# Patient Record
Sex: FEMALE | Race: WHITE | NOT HISPANIC OR LATINO | URBAN - METROPOLITAN AREA
[De-identification: names, ages, dates, MRNs, and addresses within clinical notes are randomized per-mention and may not be internally consistent; named-entity substitution may affect disease eponyms.]

---

## 2017-03-21 ENCOUNTER — OUTPATIENT (OUTPATIENT)
Dept: OUTPATIENT SERVICES | Facility: HOSPITAL | Age: 47
LOS: 1 days | Discharge: HOME | End: 2017-03-21

## 2017-06-27 DIAGNOSIS — Z12.31 ENCOUNTER FOR SCREENING MAMMOGRAM FOR MALIGNANT NEOPLASM OF BREAST: ICD-10-CM

## 2017-09-18 ENCOUNTER — OUTPATIENT (OUTPATIENT)
Dept: OUTPATIENT SERVICES | Facility: HOSPITAL | Age: 47
LOS: 1 days | Discharge: HOME | End: 2017-09-18

## 2017-09-18 DIAGNOSIS — E04.0 NONTOXIC DIFFUSE GOITER: ICD-10-CM

## 2018-05-22 PROBLEM — Z00.00 ENCOUNTER FOR PREVENTIVE HEALTH EXAMINATION: Status: ACTIVE | Noted: 2018-05-22

## 2018-05-30 ENCOUNTER — APPOINTMENT (OUTPATIENT)
Dept: OBGYN | Facility: CLINIC | Age: 48
End: 2018-05-30
Payer: COMMERCIAL

## 2018-05-30 PROCEDURE — 99396 PREV VISIT EST AGE 40-64: CPT

## 2018-06-05 ENCOUNTER — OUTPATIENT (OUTPATIENT)
Dept: OUTPATIENT SERVICES | Facility: HOSPITAL | Age: 48
LOS: 1 days | Discharge: HOME | End: 2018-06-05

## 2018-06-05 DIAGNOSIS — N64.4 MASTODYNIA: ICD-10-CM

## 2018-09-18 ENCOUNTER — EMERGENCY (EMERGENCY)
Facility: HOSPITAL | Age: 48
LOS: 0 days | Discharge: HOME | End: 2018-09-18
Attending: EMERGENCY MEDICINE | Admitting: EMERGENCY MEDICINE

## 2018-09-18 VITALS — TEMPERATURE: 96 F | SYSTOLIC BLOOD PRESSURE: 121 MMHG | HEART RATE: 69 BPM | DIASTOLIC BLOOD PRESSURE: 59 MMHG

## 2018-09-18 VITALS
OXYGEN SATURATION: 98 % | HEART RATE: 82 BPM | SYSTOLIC BLOOD PRESSURE: 140 MMHG | HEIGHT: 65 IN | WEIGHT: 225.09 LBS | RESPIRATION RATE: 20 BRPM | DIASTOLIC BLOOD PRESSURE: 79 MMHG | TEMPERATURE: 97 F

## 2018-09-18 DIAGNOSIS — R07.89 OTHER CHEST PAIN: ICD-10-CM

## 2018-09-18 DIAGNOSIS — R42 DIZZINESS AND GIDDINESS: ICD-10-CM

## 2018-09-18 DIAGNOSIS — R06.02 SHORTNESS OF BREATH: ICD-10-CM

## 2018-09-18 DIAGNOSIS — R07.9 CHEST PAIN, UNSPECIFIED: ICD-10-CM

## 2018-09-18 DIAGNOSIS — E03.9 HYPOTHYROIDISM, UNSPECIFIED: ICD-10-CM

## 2018-09-18 DIAGNOSIS — R10.13 EPIGASTRIC PAIN: ICD-10-CM

## 2018-09-18 DIAGNOSIS — R11.0 NAUSEA: ICD-10-CM

## 2018-09-18 DIAGNOSIS — Z82.49 FAMILY HISTORY OF ISCHEMIC HEART DISEASE AND OTHER DISEASES OF THE CIRCULATORY SYSTEM: ICD-10-CM

## 2018-09-18 LAB
ALBUMIN SERPL ELPH-MCNC: 4.2 G/DL — SIGNIFICANT CHANGE UP (ref 3.5–5.2)
ALP SERPL-CCNC: 69 U/L — SIGNIFICANT CHANGE UP (ref 30–115)
ALT FLD-CCNC: 18 U/L — SIGNIFICANT CHANGE UP (ref 0–41)
ANION GAP SERPL CALC-SCNC: 12 MMOL/L — SIGNIFICANT CHANGE UP (ref 7–14)
APTT BLD: 30.4 SEC — SIGNIFICANT CHANGE UP (ref 27–39.2)
AST SERPL-CCNC: 17 U/L — SIGNIFICANT CHANGE UP (ref 0–41)
BASOPHILS # BLD AUTO: 0.08 K/UL — SIGNIFICANT CHANGE UP (ref 0–0.2)
BASOPHILS NFR BLD AUTO: 1 % — SIGNIFICANT CHANGE UP (ref 0–1)
BILIRUB SERPL-MCNC: 0.3 MG/DL — SIGNIFICANT CHANGE UP (ref 0.2–1.2)
BUN SERPL-MCNC: 9 MG/DL — LOW (ref 10–20)
CALCIUM SERPL-MCNC: 9.2 MG/DL — SIGNIFICANT CHANGE UP (ref 8.5–10.1)
CHLORIDE SERPL-SCNC: 102 MMOL/L — SIGNIFICANT CHANGE UP (ref 98–110)
CHOLEST SERPL-MCNC: 185 MG/DL — SIGNIFICANT CHANGE UP (ref 100–200)
CO2 SERPL-SCNC: 26 MMOL/L — SIGNIFICANT CHANGE UP (ref 17–32)
CREAT SERPL-MCNC: 0.7 MG/DL — SIGNIFICANT CHANGE UP (ref 0.7–1.5)
D DIMER BLD IA.RAPID-MCNC: 140 NG/ML DDU — SIGNIFICANT CHANGE UP (ref 0–230)
EOSINOPHIL # BLD AUTO: 0.27 K/UL — SIGNIFICANT CHANGE UP (ref 0–0.7)
EOSINOPHIL NFR BLD AUTO: 3.3 % — SIGNIFICANT CHANGE UP (ref 0–8)
GLUCOSE SERPL-MCNC: 97 MG/DL — SIGNIFICANT CHANGE UP (ref 70–99)
HCG SERPL QL: NEGATIVE — SIGNIFICANT CHANGE UP
HCT VFR BLD CALC: 36.3 % — LOW (ref 37–47)
HDLC SERPL-MCNC: 57 MG/DL — SIGNIFICANT CHANGE UP
HGB BLD-MCNC: 11.9 G/DL — LOW (ref 12–16)
IMM GRANULOCYTES NFR BLD AUTO: 0.1 % — SIGNIFICANT CHANGE UP (ref 0.1–0.3)
INR BLD: 1.06 RATIO — SIGNIFICANT CHANGE UP (ref 0.65–1.3)
LIPID PNL WITH DIRECT LDL SERPL: 117 MG/DL — SIGNIFICANT CHANGE UP (ref 4–129)
LYMPHOCYTES # BLD AUTO: 2.39 K/UL — SIGNIFICANT CHANGE UP (ref 1.2–3.4)
LYMPHOCYTES # BLD AUTO: 29.3 % — SIGNIFICANT CHANGE UP (ref 20.5–51.1)
MAGNESIUM SERPL-MCNC: 2.1 MG/DL — SIGNIFICANT CHANGE UP (ref 1.8–2.4)
MCHC RBC-ENTMCNC: 28.8 PG — SIGNIFICANT CHANGE UP (ref 27–31)
MCHC RBC-ENTMCNC: 32.8 G/DL — SIGNIFICANT CHANGE UP (ref 32–37)
MCV RBC AUTO: 87.9 FL — SIGNIFICANT CHANGE UP (ref 81–99)
MONOCYTES # BLD AUTO: 0.76 K/UL — HIGH (ref 0.1–0.6)
MONOCYTES NFR BLD AUTO: 9.3 % — SIGNIFICANT CHANGE UP (ref 1.7–9.3)
NEUTROPHILS # BLD AUTO: 4.66 K/UL — SIGNIFICANT CHANGE UP (ref 1.4–6.5)
NEUTROPHILS NFR BLD AUTO: 57 % — SIGNIFICANT CHANGE UP (ref 42.2–75.2)
NRBC # BLD: 0 /100 WBCS — SIGNIFICANT CHANGE UP (ref 0–0)
PLATELET # BLD AUTO: 321 K/UL — SIGNIFICANT CHANGE UP (ref 130–400)
POTASSIUM SERPL-MCNC: 3.9 MMOL/L — SIGNIFICANT CHANGE UP (ref 3.5–5)
POTASSIUM SERPL-SCNC: 3.9 MMOL/L — SIGNIFICANT CHANGE UP (ref 3.5–5)
PROT SERPL-MCNC: 7.3 G/DL — SIGNIFICANT CHANGE UP (ref 6–8)
PROTHROM AB SERPL-ACNC: 11.4 SEC — SIGNIFICANT CHANGE UP (ref 9.95–12.87)
RBC # BLD: 4.13 M/UL — LOW (ref 4.2–5.4)
RBC # FLD: 14.7 % — HIGH (ref 11.5–14.5)
SODIUM SERPL-SCNC: 140 MMOL/L — SIGNIFICANT CHANGE UP (ref 135–146)
TOTAL CHOLESTEROL/HDL RATIO MEASUREMENT: 3.2 RATIO — LOW (ref 4–5.5)
TRIGL SERPL-MCNC: 102 MG/DL — SIGNIFICANT CHANGE UP (ref 10–149)
TROPONIN T SERPL-MCNC: <0.01 NG/ML — SIGNIFICANT CHANGE UP
TROPONIN T SERPL-MCNC: <0.01 NG/ML — SIGNIFICANT CHANGE UP
WBC # BLD: 8.17 K/UL — SIGNIFICANT CHANGE UP (ref 4.8–10.8)
WBC # FLD AUTO: 8.17 K/UL — SIGNIFICANT CHANGE UP (ref 4.8–10.8)

## 2018-09-18 RX ORDER — SODIUM CHLORIDE 9 MG/ML
1000 INJECTION INTRAMUSCULAR; INTRAVENOUS; SUBCUTANEOUS ONCE
Qty: 0 | Refills: 0 | Status: COMPLETED | OUTPATIENT
Start: 2018-09-18 | End: 2018-09-18

## 2018-09-18 RX ORDER — ASPIRIN/CALCIUM CARB/MAGNESIUM 324 MG
325 TABLET ORAL ONCE
Qty: 0 | Refills: 0 | Status: COMPLETED | OUTPATIENT
Start: 2018-09-18 | End: 2018-09-18

## 2018-09-18 RX ADMIN — SODIUM CHLORIDE 1000 MILLILITER(S): 9 INJECTION INTRAMUSCULAR; INTRAVENOUS; SUBCUTANEOUS at 07:51

## 2018-09-18 RX ADMIN — Medication 325 MILLIGRAM(S): at 07:51

## 2018-09-18 RX ADMIN — SODIUM CHLORIDE 1000 MILLILITER(S): 9 INJECTION INTRAMUSCULAR; INTRAVENOUS; SUBCUTANEOUS at 08:51

## 2018-09-18 NOTE — ED ADULT TRIAGE NOTE - CHIEF COMPLAINT QUOTE
" I have pain in my left chest and goes in my arm started @ 5 am."." " I have pain in my left chest and goes in my arm started @ 5 am. and my stomach feels bloated"."

## 2018-09-18 NOTE — ED CDU PROVIDER INITIAL DAY NOTE - ATTENDING CONTRIBUTION TO CARE
Seen with PA agreee with above, lungs- clear, abdomen- soft no tenderness to any region, neuro- non focal, s1s2 no gallops or murmurs, full workup in progress will continue to re-evaluate

## 2018-09-18 NOTE — ED PROVIDER NOTE - ATTENDING CONTRIBUTION TO CARE
This is a 48yoF hypothyroidism remote former smoker who presents for an episode of chest pain associated w/ SOB, nausea, abd bloating and L arm tingling.  Episode started ~5am while sitting at work.  Denies prior chest pain or recent cardiac workup.  FHx extensive CAD (father with 4vCABG x 3?) but no early heart disease.  No sick contacts.  Pain much improved now, though still some residual discomfort compared to baseline.    VSS  CONSTITUTIONAL: well developed; well nourished; well appearing in no acute distress  HEAD: normocephalic; atraumatic  EYES: no conjunctival injection, no scleral icterus  ENT: no nasal discharge; airway clear.  NECK: supple; non tender. + full passive ROM in all directions  CARD: S1, S2 normal; no murmurs, gallops, or rubs. Regular rate and rhythm  RESP: no wheezes, rales or rhonchi. Good air movement bilaterally without significant accessory muscle use  ABD: soft; non-distended; non-tender. No rebound, no guarding, no pulsatile abdominal mass  EXT: moving all extremities spontaneously, normal ROM. Mild peripheral edema b/l  SKIN: warm and dry, no lesions noted  NEURO: alert, oriented, CN II-XII grossly intact, motor and sensory grossly intact, speech nonslurred, no focal deficits. GCS 15  PSYCH: calm, cooperative, appropriate, good eye contact, logical thought process, no apparent danger to self or others    EKG NSR normal axis normal intervals similar compared to prior  labs  cardiac monitoring    Did have long car ride (>10hr) ~2weeks ago with transient foot swelling.  No extremity sx now, but that is concerning for PE, so will obtain d-dimer. If pos, will proceed to PE. If neg, pt to be admitted (or obs) for chest pain workup, including serial troponins, cardiac monitoring and some form of risk stratification, likely stress test.  Pt currently very comfortable appearing, asking for a blanket, speaking in full sentences w/o respiratory or other distress.

## 2018-09-18 NOTE — ED PROVIDER NOTE - PHYSICAL EXAMINATION
VITAL SIGNS: I have reviewed nursing notes and confirm.  CONSTITUTIONAL: Well-developed; well-nourished; in no acute distress.  SKIN: Skin exam is warm and dry, no acute rash.  HEAD: Normocephalic; atraumatic.  EYES: PERRL, EOM intact; conjunctiva and sclera clear.  ENT: No nasal discharge; airway clear.   NECK: Supple; non tender.  CARD: S1, S2 normal; no murmurs, gallops, or rubs. Regular rate and rhythm.  RESP: Clear to auscultation bilaterally. No wheezes, rales or rhonchi.  ABD: Normal bowel sounds; soft; non-distended; non-tender.   EXT: Normal ROM. No edema. No calf tenderness.   LYMPH: No acute cervical adenopathy.  NEURO: Alert, oriented. Grossly unremarkable. No focal deficits.  PSYCH: Cooperative, appropriate.

## 2018-09-18 NOTE — ED ADULT NURSE NOTE - OBJECTIVE STATEMENT
Pt c/o midsternal chest pain radiating to left arm, abd bloating, SOB, and nausea since 5am this morning. Pt is  in hospital and was at rest when pain began. Denies h/a, numbness, tingling, vomiting. AAO x 4. Hx of hypothyroidism

## 2018-09-18 NOTE — ED CDU PROVIDER DISPOSITION NOTE - CLINICAL COURSE
Patient was sent to [facility] for further evaluation of one episode of chest pains and epigastric pains, Has remained in no distress and with stable vitals, ekgs times two no evidence of ischemic chnages, enzymes times two normal, Patient has not had any further chest pains. Does not want to undergo stress testing and wants to fallow up with cardiology Rickie Wilde this week

## 2018-09-18 NOTE — ED PROVIDER NOTE - MEDICAL DECISION MAKING DETAILS
48yoF hypothyroid remote former smoker presents for substernal chest pain w/ abd and L arm sx, now improved.  EKG, trop x 1 neg.  Pt given aspirin and placed in observation for further risk stratification and ongoing monitoring.

## 2018-09-18 NOTE — ED CDU PROVIDER INITIAL DAY NOTE - OBJECTIVE STATEMENT
49 y/o female with PMHx of Hypothyroidism, Former Smoker, Positive Cardiac Family History presenting for chest pain. As per pt, while at work this morning around 5am began to have chest pain. Pt states pain started mid-sternal and radiated downward into the abdomen, then pain radiated to the left side of her chest. Pt states she was also experiencing left arm heaviness/tingling. Pt states pain lasted for approx an hour. States pain had progressively worsened. Also admits to nausea and diaphoresis. Denies any previous history of chest pain. Cardiologist - Dr Wilde

## 2018-09-18 NOTE — ED PROVIDER NOTE - NS ED ROS FT
Review of Systems:  	•	CONSTITUTIONAL - no fever, no diaphoresis, no chills  	•	SKIN - no rash  	•	HEMATOLOGIC - no bleeding, no bruising  	•	EYES - no eye pain, no blurry vision  	•	ENT - no congestion  	•	RESPIRATORY - + shortness of breath, no cough  	•	CARDIAC - + chest pain, no palpitations  	•	GI - + abd pain, + nausea, no vomiting, no diarrhea, no constipation  	•	GENITO-URINARY - no dysuria; no hematuria, no increased urinary frequency  	•	MUSCULOSKELETAL - +left arm heaviness, no swelling, no redness  	•	NEUROLOGIC - +lightheadedness, no weakness, no headache, no paresthesias, no LOC  	All other ROS are negative except as documented in HPI.

## 2018-09-18 NOTE — ED ADULT NURSE NOTE - NSIMPLEMENTINTERV_GEN_ALL_ED
Implemented All Universal Safety Interventions:  [place] to call system. Call bell, personal items and telephone within reach. Instruct patient to call for assistance. Room bathroom lighting operational. Non-slip footwear when patient is off stretcher. Physically safe environment: no spills, clutter or unnecessary equipment. Stretcher in lowest position, wheels locked, appropriate side rails in place.

## 2018-09-18 NOTE — ED PROVIDER NOTE - OBJECTIVE STATEMENT
49 yo F with pmhx of hypothyroidism presenting with midsternal chest pain radiating to left arm associated with shortness of breath, nausea, abdominal bloating, and lightheadedness that started around 5am. Patient was sitting down at work when chest pain began. No pleuritic chest pain. Patient is a former smoker. No prior cardiac workup. +Family history of CAD - father at age 59. No fever, chills, abdominal pain, vomiting, diarrhea, back pain, urinary symptoms, headache, paresthesias, or weakness. +Patient with recent long car ride to [facility] 2 weeks ago.

## 2019-01-13 ENCOUNTER — EMERGENCY (EMERGENCY)
Facility: HOSPITAL | Age: 49
LOS: 0 days | Discharge: HOME | End: 2019-01-13
Attending: EMERGENCY MEDICINE | Admitting: EMERGENCY MEDICINE

## 2019-01-13 VITALS
SYSTOLIC BLOOD PRESSURE: 115 MMHG | OXYGEN SATURATION: 96 % | RESPIRATION RATE: 18 BRPM | TEMPERATURE: 97 F | DIASTOLIC BLOOD PRESSURE: 60 MMHG | HEART RATE: 79 BPM

## 2019-01-13 DIAGNOSIS — Z79.899 OTHER LONG TERM (CURRENT) DRUG THERAPY: ICD-10-CM

## 2019-01-13 DIAGNOSIS — E03.9 HYPOTHYROIDISM, UNSPECIFIED: ICD-10-CM

## 2019-01-13 DIAGNOSIS — K80.70 CALCULUS OF GALLBLADDER AND BILE DUCT WITHOUT CHOLECYSTITIS WITHOUT OBSTRUCTION: ICD-10-CM

## 2019-01-13 DIAGNOSIS — R11.2 NAUSEA WITH VOMITING, UNSPECIFIED: ICD-10-CM

## 2019-01-13 DIAGNOSIS — R10.9 UNSPECIFIED ABDOMINAL PAIN: ICD-10-CM

## 2019-01-13 LAB
ALBUMIN SERPL ELPH-MCNC: 4.1 G/DL — SIGNIFICANT CHANGE UP (ref 3.5–5.2)
ALP SERPL-CCNC: 77 U/L — SIGNIFICANT CHANGE UP (ref 30–115)
ALT FLD-CCNC: 23 U/L — SIGNIFICANT CHANGE UP (ref 0–41)
ANION GAP SERPL CALC-SCNC: 11 MMOL/L — SIGNIFICANT CHANGE UP (ref 7–14)
AST SERPL-CCNC: 16 U/L — SIGNIFICANT CHANGE UP (ref 0–41)
BASE EXCESS BLDV CALC-SCNC: 3.4 MMOL/L — HIGH (ref -2–2)
BASOPHILS # BLD AUTO: 0.08 K/UL — SIGNIFICANT CHANGE UP (ref 0–0.2)
BASOPHILS NFR BLD AUTO: 0.9 % — SIGNIFICANT CHANGE UP (ref 0–1)
BILIRUB DIRECT SERPL-MCNC: <0.2 MG/DL — SIGNIFICANT CHANGE UP (ref 0–0.2)
BILIRUB INDIRECT FLD-MCNC: >0 MG/DL — LOW (ref 0.2–1.2)
BILIRUB SERPL-MCNC: 0.2 MG/DL — SIGNIFICANT CHANGE UP (ref 0.2–1.2)
BUN SERPL-MCNC: 9 MG/DL — LOW (ref 10–20)
CA-I SERPL-SCNC: 1.36 MMOL/L — HIGH (ref 1.12–1.3)
CALCIUM SERPL-MCNC: 9.1 MG/DL — SIGNIFICANT CHANGE UP (ref 8.5–10.1)
CHLORIDE SERPL-SCNC: 101 MMOL/L — SIGNIFICANT CHANGE UP (ref 98–110)
CO2 SERPL-SCNC: 27 MMOL/L — SIGNIFICANT CHANGE UP (ref 17–32)
CREAT SERPL-MCNC: 0.6 MG/DL — LOW (ref 0.7–1.5)
EOSINOPHIL # BLD AUTO: 0.41 K/UL — SIGNIFICANT CHANGE UP (ref 0–0.7)
EOSINOPHIL NFR BLD AUTO: 4.4 % — SIGNIFICANT CHANGE UP (ref 0–8)
GAS PNL BLDV: 136 MMOL/L — SIGNIFICANT CHANGE UP (ref 136–145)
GAS PNL BLDV: SIGNIFICANT CHANGE UP
GLUCOSE SERPL-MCNC: 125 MG/DL — HIGH (ref 70–99)
HCO3 BLDV-SCNC: 29 MMOL/L — SIGNIFICANT CHANGE UP (ref 22–29)
HCT VFR BLD CALC: 36.4 % — LOW (ref 37–47)
HCT VFR BLDA CALC: 40.9 % — SIGNIFICANT CHANGE UP (ref 34–44)
HGB BLD CALC-MCNC: 13.4 G/DL — LOW (ref 14–18)
HGB BLD-MCNC: 11.6 G/DL — LOW (ref 12–16)
IMM GRANULOCYTES NFR BLD AUTO: 0.4 % — HIGH (ref 0.1–0.3)
INR BLD: 0.95 RATIO — SIGNIFICANT CHANGE UP (ref 0.65–1.3)
LACTATE BLDV-MCNC: 1.3 MMOL/L — SIGNIFICANT CHANGE UP (ref 0.5–1.6)
LIDOCAIN IGE QN: 43 U/L — SIGNIFICANT CHANGE UP (ref 7–60)
LYMPHOCYTES # BLD AUTO: 2.91 K/UL — SIGNIFICANT CHANGE UP (ref 1.2–3.4)
LYMPHOCYTES # BLD AUTO: 31.2 % — SIGNIFICANT CHANGE UP (ref 20.5–51.1)
MCHC RBC-ENTMCNC: 28.4 PG — SIGNIFICANT CHANGE UP (ref 27–31)
MCHC RBC-ENTMCNC: 31.9 G/DL — LOW (ref 32–37)
MCV RBC AUTO: 89 FL — SIGNIFICANT CHANGE UP (ref 81–99)
MONOCYTES # BLD AUTO: 1.19 K/UL — HIGH (ref 0.1–0.6)
MONOCYTES NFR BLD AUTO: 12.7 % — HIGH (ref 1.7–9.3)
NEUTROPHILS # BLD AUTO: 4.71 K/UL — SIGNIFICANT CHANGE UP (ref 1.4–6.5)
NEUTROPHILS NFR BLD AUTO: 50.4 % — SIGNIFICANT CHANGE UP (ref 42.2–75.2)
NRBC # BLD: 0 /100 WBCS — SIGNIFICANT CHANGE UP (ref 0–0)
PCO2 BLDV: 48 MMHG — SIGNIFICANT CHANGE UP (ref 41–51)
PH BLDV: 7.39 — SIGNIFICANT CHANGE UP (ref 7.26–7.43)
PLATELET # BLD AUTO: 302 K/UL — SIGNIFICANT CHANGE UP (ref 130–400)
PO2 BLDV: 28 MMHG — SIGNIFICANT CHANGE UP (ref 20–40)
POTASSIUM BLDV-SCNC: 3.8 MMOL/L — SIGNIFICANT CHANGE UP (ref 3.3–5.6)
POTASSIUM SERPL-MCNC: 4.1 MMOL/L — SIGNIFICANT CHANGE UP (ref 3.5–5)
POTASSIUM SERPL-SCNC: 4.1 MMOL/L — SIGNIFICANT CHANGE UP (ref 3.5–5)
PROT SERPL-MCNC: 6.8 G/DL — SIGNIFICANT CHANGE UP (ref 6–8)
PROTHROM AB SERPL-ACNC: 10.9 SEC — SIGNIFICANT CHANGE UP (ref 9.95–12.87)
RBC # BLD: 4.09 M/UL — LOW (ref 4.2–5.4)
RBC # FLD: 15.7 % — HIGH (ref 11.5–14.5)
SAO2 % BLDV: 47 % — SIGNIFICANT CHANGE UP
SODIUM SERPL-SCNC: 139 MMOL/L — SIGNIFICANT CHANGE UP (ref 135–146)
TROPONIN T SERPL-MCNC: <0.01 NG/ML — SIGNIFICANT CHANGE UP
WBC # BLD: 9.34 K/UL — SIGNIFICANT CHANGE UP (ref 4.8–10.8)
WBC # FLD AUTO: 9.34 K/UL — SIGNIFICANT CHANGE UP (ref 4.8–10.8)

## 2019-01-13 RX ORDER — SODIUM CHLORIDE 9 MG/ML
3 INJECTION INTRAMUSCULAR; INTRAVENOUS; SUBCUTANEOUS ONCE
Qty: 0 | Refills: 0 | Status: COMPLETED | OUTPATIENT
Start: 2019-01-13 | End: 2019-01-13

## 2019-01-13 RX ORDER — DIPHENHYDRAMINE HYDROCHLORIDE AND LIDOCAINE HYDROCHLORIDE AND ALUMINUM HYDROXIDE AND MAGNESIUM HYDRO
30 KIT ONCE
Qty: 0 | Refills: 0 | Status: COMPLETED | OUTPATIENT
Start: 2019-01-13 | End: 2019-01-13

## 2019-01-13 RX ORDER — FAMOTIDINE 10 MG/ML
20 INJECTION INTRAVENOUS ONCE
Qty: 0 | Refills: 0 | Status: COMPLETED | OUTPATIENT
Start: 2019-01-13 | End: 2019-01-13

## 2019-01-13 RX ORDER — SODIUM CHLORIDE 9 MG/ML
1000 INJECTION INTRAMUSCULAR; INTRAVENOUS; SUBCUTANEOUS
Qty: 0 | Refills: 0 | Status: DISCONTINUED | OUTPATIENT
Start: 2019-01-13 | End: 2019-01-13

## 2019-01-13 RX ORDER — MORPHINE SULFATE 50 MG/1
4 CAPSULE, EXTENDED RELEASE ORAL ONCE
Qty: 0 | Refills: 0 | Status: COMPLETED | OUTPATIENT
Start: 2019-01-13 | End: 2019-01-13

## 2019-01-13 RX ORDER — ONDANSETRON 8 MG/1
4 TABLET, FILM COATED ORAL ONCE
Qty: 0 | Refills: 0 | Status: COMPLETED | OUTPATIENT
Start: 2019-01-13 | End: 2019-01-13

## 2019-01-13 RX ADMIN — FAMOTIDINE 20 MILLIGRAM(S): 10 INJECTION INTRAVENOUS at 04:42

## 2019-01-13 RX ADMIN — SODIUM CHLORIDE 3 MILLILITER(S): 9 INJECTION INTRAMUSCULAR; INTRAVENOUS; SUBCUTANEOUS at 04:37

## 2019-01-13 RX ADMIN — ONDANSETRON 4 MILLIGRAM(S): 8 TABLET, FILM COATED ORAL at 04:41

## 2019-01-13 RX ADMIN — DIPHENHYDRAMINE HYDROCHLORIDE AND LIDOCAINE HYDROCHLORIDE AND ALUMINUM HYDROXIDE AND MAGNESIUM HYDRO 30 MILLILITER(S): KIT at 04:37

## 2019-01-13 RX ADMIN — SODIUM CHLORIDE 125 MILLILITER(S): 9 INJECTION INTRAMUSCULAR; INTRAVENOUS; SUBCUTANEOUS at 04:37

## 2019-01-13 NOTE — ED PROVIDER NOTE - OBJECTIVE STATEMENT
47 yo female with epigastric/ RUQ bertha this evening associated with nausea and NBNB emesis.  Worse with palpation, radiating from epigastrium down to the abdomen, no pleuritic component, no CP/SOB/focal weakness or paresthesias, no fever or chills.  Exam notable for epigastric/RUQ ttp without rebound or guarding.  Will check labs, get RUQ sono, treat pain, reassess.

## 2019-01-13 NOTE — ED PROVIDER NOTE - ATTENDING CONTRIBUTION TO CARE
49 yo female with epigastric/ RUQ bertha this evening associated with nausea and NBNB emesis.  Worse with palpation, radiating from epigastrium down to the abdomen, no pleuritic component, no CP/SOB/focal weakness or paresthesias, no fever or chills.  Exam notable for epigastric/RUQ ttp without rebound or guarding.  Will check labs, get RUQ sono, treat pain, reassess.  Patient is amenable with the plan.

## 2019-01-13 NOTE — ED PROVIDER NOTE - PHYSICAL EXAMINATION
Constitutional: Well developed, well nourished moderate distress due to abdominal pain  Head: Normocephalic, atraumatic.  Eyes: PERRL, EOMI.  ENT: No nasal discharge. Mucous membranes dry.  Neck: Supple. Painless ROM.  Cardiovascular: Normal S1, S2. Regular rate and rhythm.  Pulmonary: Lungs clear to auscultation bilaterally.   Abdominal: Soft + epigastric and RUQ pain; no rebound guarding or flank pain  Extremities. Pelvis stable. No lower extremity edema, symmetric calves.  Skin: No rashes, cyanosis.  Neuro: AAOx3. No focal neurological deficits.  Psych: Normal mood. Normal affect.

## 2019-01-13 NOTE — ED ADULT NURSE NOTE - NSIMPLEMENTINTERV_GEN_ALL_ED
Implemented All Universal Safety Interventions:  [place] to call system. Call bell, personal items and telephone within reach. Instruct patient to call for assistance. Room bathroom lighting operational. Non-slip footwear when patient is off stretcher. Physically safe environment: no spills, clutter or unnecessary equipment. Stretcher in lowest position, wheels locked, appropriate side rails in place.

## 2019-01-13 NOTE — ED PROVIDER NOTE - MEDICAL DECISION MAKING DETAILS
47 yo female with biliary colic , labs WNL, ECG OK, RUQ sono negative for acute pathology.  D/c in a stable condition, pain improved, tolerating PO.

## 2019-01-13 NOTE — ED PROVIDER NOTE - PROGRESS NOTE DETAILS
Appears well, states she is feeling much better and requesting to be sent home.  Instructed to follow up with her PMD, patient already has an upcoming appointment with her gastroenterologist, strict return precautions given.  She verbalized understanding and is amenable with the plan.

## 2019-01-21 ENCOUNTER — OUTPATIENT (OUTPATIENT)
Dept: OUTPATIENT SERVICES | Facility: HOSPITAL | Age: 49
LOS: 1 days | Discharge: HOME | End: 2019-01-21

## 2019-01-21 DIAGNOSIS — R91.1 SOLITARY PULMONARY NODULE: ICD-10-CM

## 2019-05-07 ENCOUNTER — OUTPATIENT (OUTPATIENT)
Dept: OUTPATIENT SERVICES | Facility: HOSPITAL | Age: 49
LOS: 1 days | Discharge: HOME | End: 2019-05-07
Payer: COMMERCIAL

## 2019-05-07 PROCEDURE — 93970 EXTREMITY STUDY: CPT | Mod: 26

## 2019-05-16 DIAGNOSIS — R79.89 OTHER SPECIFIED ABNORMAL FINDINGS OF BLOOD CHEMISTRY: ICD-10-CM

## 2019-06-05 ENCOUNTER — OUTPATIENT (OUTPATIENT)
Dept: OUTPATIENT SERVICES | Facility: HOSPITAL | Age: 49
LOS: 1 days | Discharge: HOME | End: 2019-06-05
Payer: COMMERCIAL

## 2019-06-05 DIAGNOSIS — R91.1 SOLITARY PULMONARY NODULE: ICD-10-CM

## 2019-06-05 PROCEDURE — 71250 CT THORAX DX C-: CPT | Mod: 26

## 2019-06-18 ENCOUNTER — OUTPATIENT (OUTPATIENT)
Dept: OUTPATIENT SERVICES | Facility: HOSPITAL | Age: 49
LOS: 1 days | Discharge: HOME | End: 2019-06-18
Payer: COMMERCIAL

## 2019-06-18 DIAGNOSIS — Z12.31 ENCOUNTER FOR SCREENING MAMMOGRAM FOR MALIGNANT NEOPLASM OF BREAST: ICD-10-CM

## 2019-06-18 PROCEDURE — 77063 BREAST TOMOSYNTHESIS BI: CPT | Mod: 26

## 2019-06-18 PROCEDURE — 77067 SCR MAMMO BI INCL CAD: CPT | Mod: 26

## 2019-07-10 ENCOUNTER — APPOINTMENT (OUTPATIENT)
Dept: CARDIOTHORACIC SURGERY | Facility: CLINIC | Age: 49
End: 2019-07-10
Payer: COMMERCIAL

## 2019-07-10 VITALS
DIASTOLIC BLOOD PRESSURE: 83 MMHG | WEIGHT: 225 LBS | TEMPERATURE: 98.6 F | SYSTOLIC BLOOD PRESSURE: 116 MMHG | HEIGHT: 65 IN | HEART RATE: 90 BPM | RESPIRATION RATE: 13 BRPM | BODY MASS INDEX: 37.49 KG/M2

## 2019-07-10 DIAGNOSIS — Z82.49 FAMILY HISTORY OF ISCHEMIC HEART DISEASE AND OTHER DISEASES OF THE CIRCULATORY SYSTEM: ICD-10-CM

## 2019-07-10 DIAGNOSIS — Z83.49 FAMILY HISTORY OF OTHER ENDOCRINE, NUTRITIONAL AND METABOLIC DISEASES: ICD-10-CM

## 2019-07-10 DIAGNOSIS — Z78.9 OTHER SPECIFIED HEALTH STATUS: ICD-10-CM

## 2019-07-10 DIAGNOSIS — Z80.1 FAMILY HISTORY OF MALIGNANT NEOPLASM OF TRACHEA, BRONCHUS AND LUNG: ICD-10-CM

## 2019-07-10 DIAGNOSIS — R91.1 SOLITARY PULMONARY NODULE: ICD-10-CM

## 2019-07-10 PROCEDURE — 99244 OFF/OP CNSLTJ NEW/EST MOD 40: CPT

## 2019-07-10 RX ORDER — ACETAMINOPHEN 325 MG
5000 TABLET ORAL
Refills: 0 | Status: ACTIVE | COMMUNITY
Start: 2019-07-10

## 2019-07-10 RX ORDER — CHROMIUM 200 MCG
1000 TABLET ORAL
Refills: 0 | Status: ACTIVE | COMMUNITY
Start: 2019-07-10

## 2019-07-10 RX ORDER — B-COMPLEX WITH VITAMIN C
TABLET ORAL
Refills: 0 | Status: ACTIVE | COMMUNITY
Start: 2019-07-10

## 2019-07-10 NOTE — PHYSICAL EXAM
[General Appearance - In No Acute Distress] : in no acute distress [General Appearance - Alert] : alert [Sclera] : the sclera and conjunctiva were normal [PERRL With Normal Accommodation] : pupils were equal in size, round, and reactive to light [Extraocular Movements] : extraocular movements were intact [Deep Tendon Reflexes (DTR)] : deep tendon reflexes were 2+ and symmetric [Sensation] : the sensory exam was normal to light touch and pinprick [No Focal Deficits] : no focal deficits [Impaired Insight] : insight and judgment were intact [Oriented To Time, Place, And Person] : oriented to person, place, and time [Affect] : the affect was normal [] : no respiratory distress [Auscultation Breath Sounds / Voice Sounds] : lungs were clear to auscultation bilaterally [Heart Rate And Rhythm] : heart rate was normal and rhythm regular [Heart Sounds] : normal S1 and S2 [Murmurs] : no murmurs [Heart Sounds Gallop] : no gallops [Heart Sounds Pericardial Friction Rub] : no pericardial rub

## 2019-07-14 NOTE — HISTORY OF PRESENT ILLNESS
[FreeTextEntry1] : Ms. DOUGLAS MAGANA is a 49 year F who arrives today for a consultation for a lung nodule referred to our office by Dr. Meeks. She reports that on 19, she was admitted to the hospital for CP, and was found to have cholecystitis, for which she refused cholecystectomy. She then was called at home 3 days later and informed that she had a growth in her lungs on her CXR. Subsequently to that, she then had a CT chest done by her PMD for further evaluation and sent to her pulmonologist.\par Her PMH is signifcant hypothyroidism?, asthma, and lung nodule. \par She denies any recent weight loss, skin rashes, fever, cough, hemoptysis, nausea, vomiting or CP. 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 \par \par ECO - Fully active, able to carry on all pre-disease performance without restriction\par \par Her healthcare team is as follows:\par PMD: paulticmaricarmen\par Cardio: Zgheib\par Pulm: Thom Meeks\par EPS: none\par Hem/onc: none\par Renal: none\par Endocrine: \par GI: Andrawes\par ENT: Ceiko\par  [Diabetes Mellitus] : no Diabetes Melllitus [Dyslipidemia] : no dyslipidemia [Dialysis] : no dialysis [Hypertension] : no hypertension [Infectious Endocarditis] : no infectious endocarditis [Home Oxygen] : no home oxygen use [Inhaled Medication Therapy] : no inhaled medication therapy [Sleep Apnea] : no sleep apnea [Liver Disease] : no liver disease [Immunocompromise Present] : not immunocompromised [Peripheral Artery Disease] : no peripheral artery disease [Cerebrovascular Disease] : no cerebrovascular disease [Unresponsive Neurologic State] : not in a unresponsive neurologic state [Syncope] : no syncope [Prior CVA] : with no prior CVA [Prior Myocardial Infarction] : No prior myocardial infarction [CVD TIA] : no CVD Tia [Prior Heart Failure] : no prior heart failure

## 2019-07-14 NOTE — ASSESSMENT
[FreeTextEntry1] : Ms. DOUGLAS MAGANA is a 49 year F who arrives today for a consultation for a lung nodule referred to our office by Dr. Meeks. Her imaging is reviewed in detail in clinic today.  I do not see this nodule on previous chest xrays, but conversely I have a fair amount of difficulty appreciating it on the one that identified the nodule.  Certainly it is a subtle finding and may have been present previously and hidden by rib shadow or variations in technique.  On her previous CT scan, density on the Hounsfield units correlated with some amount of fat in the lesion.  This was not commented on on the more recent imaging and I do not have the capability to assess this with the radiology viewer that I have access to.  This does raise the question of hamartoma.  The lesion is rounded, not spiculated, and shows no interval growth.  It was actually noted to decrease in size, but this is probably a spurious finding given the size of the nodule and the width of the CT cuts.  I have low suspicion that this represents a malignant process, although the patient and I discussed that the only way to determine this definitively is tissue sampling.  I think this nodule is certainly reasonable to be followed with serial imaging, with biopsy deferred unless it shows interval change.  This, of note, would be feasible for wedge resection for excisional biopsy and definitive diagnosis.  At present we will f/u with a CT scan w/o contrast in 6 months.

## 2019-07-14 NOTE — CONSULT LETTER
[Dear  ___] : Dear  [unfilled], [Consult Letter:] : I had the pleasure of evaluating your patient, [unfilled]. [DrLa  ___] : Dr. SANDOVAL [FreeTextEntry2] : Thom Peraza [FreeTextEntry1] : As you know, this is a very pleasant 49 year old female who arrives today for a consultation for a lung nodule. Her imaging is reviewed in detail in clinic today.  I do not see this nodule on previous chest xrays, but conversely I have a fair amount of difficulty appreciating it on the one that identified the nodule.  Certainly it is a subtle finding and may have been present previously and hidden by rib shadow or variations in technique.  \par \par On her previous CT scan, density on the Hounsfield units correlated with some amount of fat in the lesion.  This was not commented on on the more recent imaging and I do not have the capability to assess this with the radiology viewer that I have access to.  This does raise the question of hamartoma.  The lesion is rounded, not spiculated, and shows no interval growth.  It was actually noted to decrease in size, but this is probably a spurious finding given the size of the nodule and the width of the CT cuts.  \par \par I have low suspicion that this represents a malignant process, although the patient and I discussed that the only way to determine this definitively is tissue sampling.  I think this nodule is certainly reasonable to be followed with serial imaging, with biopsy deferred unless it shows interval change.  This, of note, would be feasible for wedge resection for excisional biopsy and definitive diagnosis.  At present we will f/u with a CT scan w/o contrast in 6 months.

## 2019-07-14 NOTE — DATA REVIEWED
[FreeTextEntry1] : EXAM: CT CHEST - 06/05/2019 \par \par INTERPRETATION: Reason for Exam: Nodule. \par \par Technique: \par CT of the chest was performed from the thoracic inlet to the level of the adrenal glands without contrast injection. Coronal and sagittal images have been submitted. MIP images were created. \par \par Comparison: CT chest January 21, 2019. \par \par Findings: \par \par Tubes/Lines: None. \par \par Lungs, Pleura, and Airways: Patent tracheobronchial tree. \par \par No evidence of pleural effusion, pneumothorax or focal opacity. Stable left upper lobe 1.2 cm smooth round nodule. \par \par Mediastinum/Lymph Nodes:No evidence of mediastinal or hilar lymphadenopathy. \par \par Heart/Great Vessels: Thoracic aortic calcifications. Normal heart size. No pericardial effusion. \par \par Abdomen: Partially visualized upper abdomen is unremarkable. \par \par Bones and soft tissues: Multilevel degenerative changes of the spine. \par \par IMPRESSION: \par \par Stable left upper lobe 1.2 cm smooth round nodule. \par \par ================================================================================\par \par Spirometry - 2/19/19\par FEV1:   Pre- 86.7\par             Post-100

## 2019-10-17 NOTE — ED ADULT NURSE NOTE - CAS DISCH TRANSFER METHOD
Closure 3 Information: This tab is for additional flaps and grafts above and beyond our usual structured repairs.  Please note if you enter information here it will not currently bill and you will need to add the billing information manually. Private car

## 2019-10-25 NOTE — ED ADULT NURSE NOTE - CHIEF COMPLAINT QUOTE
Patient was signed out to me by Dr. Menendez at end of his shift. Sign-out included relevant details of history, physical, and work-up to date. Chart has been reviewed, new test results have been noted, and patient has been re-evaluated. Summary Findings:    Results for orders placed or performed during the hospital encounter of 10/25/19   CBC with Automated Differential   Result Value    WBC 7.0    RBC 3.63 (L)    HGB 10.2 (L)    HCT 32.4 (L)    MCV 89.3    MCH 28.1    MCHC 31.5 (L)    RDW-CV 14.1        NRBC 0    DIFF TYPE AUTOMATED DIFFERENTIAL    Neutrophil 78    LYMPH 7    MONO 10    EOSIN 3    BASO 1    Percent Immature Granuloctyes 1    Absolute Neutrophil 5.5    Absolute Lymph 0.5 (L)    Absolute Mono 0.7    Absolute Eos 0.2    Absolute Baso 0.1    Absolute Immature Granulocytes 0.1   Chem 8 Panel - Point of Care   Result Value    Sodium     Potassium POC 3.9    Chloride  (H)    CALCIUM IONIZED-POC 1.14 (L)    CO2 Total 21    GLUCOSE  (H)     Comment: Reference range is for a fasting sample.    BUN POC 30 (H)    HEMATOCRIT POC 30.0 (L)    Hemoglobin POC 10.2 (L)    ANION GAP POC 15    Creatinine POC 1.50 (H)    Estimated GFR  (POC) 51     Comment: eGFR 30-59 mL/min/1.73m2 = Moderate decrease in kidney function. Stage 3 CKD (chronic kidney disease) or moderate kidney disease.    Estimated GFR Non- (POC) 44     Comment: eGFR 30-59 mL/min/1.73m2 = Moderate decrease in kidney function. Stage 3 CKD (chronic kidney disease) or moderate kidney disease.   Metered blood glucose   Result Value    Glucose Bedside  (H)     Imaging Results          US Lower Extremity Arteries Left (Final result)  Result time 10/25/19 21:11:37    Final result                 Impression:    IMPRESSION:    1.  Triphasic inflow at the common femoral artery.  2.  Severe calcification and moderate to severe narrowing between the  common femoral artery and popliteal artery, most  pt c/o epigastric pain significant at the mid  common femoral artery with greater than doubled velocity suggesting  hemodynamically significant stenosis without complete occlusion. Collateral  flow is visualized at the popliteal artery.  3.  Patent 3-vessel runoff.    I have personally reviewed the images and modified the resident's report as  necessary.               Narrative:    US LOWER EXTREMITY ARTERIES DUPLEX LEFT 10/25/2019 7:55 PM    HISTORY:  pain.    COMPARISON: Lower extremity arterial duplex 2/23/2018.    TECHNIQUE: Grayscale, color Doppler and spectral duplex images were  obtained of the left lower extremity arterial system.    FINDINGS:    Color Doppler flow and duplex images of the left lower extremity show  patent common femoral, deep femoral, superficial femoral, popliteal,  dorsalis pedis and posterior tibial arteries.     LEFT LIMB:    Grayscale imaging: There is moderate to severe scattered echogenic  atherosclerotic plaque.    The following Peak Systolic Velocities were obtained:    CFA: 126, 432, 249 cm/sec, Triphasic  PFA: 153 cm/sec, Monophasic with brisk systolic upstroke.  Proximal SFA: 277, 168, 142 cm/sec, Biphasic  Mid SFA: 226, 271, 182, 127 cm/sec, Biphasic  Distal SFA: 71 cm/sec, Triphasic  Popliteal: 71, 76, 111 cm/sec, Biphasic  Proximal Posterior Tibial: 76 cm/sec, Biphasic  Mid Posterior Tibial: 66, 19 cm/sec, Biphasic, initially with a brisk  systolic upstroke and then blunted more distally. Collateral flow  visualized.  Distal Posterior Tibial: 22, 80 cm/sec, Biphasic Collaterals visualized.  Proximal Peroneal: 72 cm/sec, Biphasic  Mid Peroneal: 44 cm/sec, Biphasic with a more blunted systolic upstroke  Distal Peroneal: 35 cm/sec, Biphasic with a blunted systolic upstroke.  Proximal Anterior Tibial: 187 cm/sec, Biphasic  Mid Anterior Tibial: 77 cm/sec, Biphasic  Dorsalis Pedis: 73 cm/sec, Biphasic with a blunted systolic upstroke.                      Preliminary result                 Impression:       1.  Triphasic inflow at the common femoral artery.  2.  Severe calcification and moderate to severe narrowing between the  common femoral artery and popliteal artery, most significant at the mid  common femoral artery with greater than doubled velocity suggesting  hemodynamically significant stenosis without complete occlusion.   Monophasic  flow is noted distally. Collateral flow is visualized at the popliteal  artery.  3.  Patent 3-vessel runoff, but with monophasic waveforms and at least  moderate narrowing without stenosis in the proximal DARREN.               Narrative:    US LOWER EXTREMITY ARTERIES DUPLEX LEFT 10/25/2019 7:55 PM    HISTORY:  pain    COMPARISON: Lower extremity arterial duplex 2/23/2018.    TECHNIQUE: Grayscale, color Doppler and spectral duplex images were  obtained of the left lower extremity arterial system.    FINDINGS:    Color Doppler flow and duplex images of the left lower extremity show  patent common femoral, deep femoral, superficial femoral, popliteal,  dorsalis pedis and posterior tibial arteries.     LEFT LIMB:    Grayscale imaging: There is moderate to severe scattered echogenic  atherosclerotic plaque.    The following Peak Systolic Velocities were obtained and reported in  centimeters per second:    CFA: 126, 432, 249 cm/sec, Triphasic  PFA: 153 cm/sec, Monophasic with brisk systolic upstroke.  Proximal SFA: 277, 168, 142 cm/sec, Monophasic with brisk systolic  upstroke.  Mid SFA: 226, 271, 182, 127 cm/sec, mixed triphasic and monophasic with  brisk systolic upstroke.  Distal SFA: 71 cm/sec, Triphasic  Popliteal: 71, 76, 111 cm/sec, Primarily Monophasic with brisk systolic  upstroke.  Proximal Posterior Tibial: 76 cm/sec, Monophasic with brisk systolic  upstroke.  Mid Posterior Tibial: 66, 19 cm/sec, Monophasic brisk upstroke and blunted  monophasic, respectively. Collateral flow visualized.  Distal Posterior Tibial: 22, 80 cm/sec, Monophasic with brisk systolic  upstroke.  Collaterals visualized.  Proximal Peroneal: 72 cm/sec, Monophasic with brisk systolic upstroke.  Mid Peroneal: 44 cm/sec, Monophasic with brisk systolic upstroke.  Distal Peroneal: 35 cm/sec, Monophasic with blunted systolic upstroke.  Proximal Anterior Tibial: 187 cm/sec, Monophasic with brisk systolic  upstroke.  Mid Anterior Tibial: 77 cm/sec, Monophasic with brisk systolic upstroke.  Dorsalis Pedis: 73 cm/sec, Monophasic with brisk systolic upstroke.                                   US Lower Extremity Venous Duplex Left (Final result)  Result time 10/25/19 20:10:41    Final result                 Impression:    IMPRESSION:    No sonographic evidence of deep venous thrombosis in the left lower  extremity.               Narrative:      US LOWER EXTREMITY VENOUS DUPLEX LEFT    HISTORY: Pain and swelling of the left leg.    COMPARISON: None.    TECHNIQUE: Grayscale with and without compressive augmentation, color  Doppler, and spectral duplex images are obtained of the left and proximal  right lower extremity deep venous system.    FINDINGS:    The visualized deep venous structures of the left lower extremity, from the  level of the calf veins cephalad to the common femoral/greater saphenous  vein junction extremity, reveal normal compressibility, color Doppler flow,  spectral duplex waveforms, and distal augmentation.                               Vitals  Vitals:    10/25/19 1750 10/25/19 1800 10/25/19 1830 10/25/19 2118   BP: 116/57 112/54  124/59   Pulse: 76 77 79 84   Resp: 15 16  16   Temp:       TempSrc:       SpO2: 98% 97% 97% 96%     ED Course    5:14 PM Recheck: I introduced myself to  Marcello Umana who is resting comfortably in bed. Dr. Krueger is assessing the pt at bedside and recommended further care beyond the ED with abx given concerns for worsening cellulitis. Dr. Mccoy was able to obtain strong dopplerable DP pulses. Pt is agreeable to further work up and treatment. Awaiting pending US  results. All questions were addressed.     5:39 PM: Paged for Dr. Bonilla from [facility].    5:41 PM: I was informed that Dr. Bonilla is not available today.     6:03 PM Recheck: I rechecked on Marcello Umana who is resting comfortably in bed. Pt confirms that he has not been on abx. He has been receiving wound care at home and his wounds have been getting better.      6:07 PM I consulted Dr. Enamorado hospitalist that was taking care of this patient until yesterday, regarding patient's presentation and ED work up. I made them aware of all ED findings, clinical impression, and treatment plan. Dr. Mccoy was initially concerned for cellulitis but given that the pt's not on abx and sx are noted to have been improving at home I am less suspicious for cellulitis. The pt had been recently evaluated by Dr. Bonilla from [facility] who had not start the pt on abx regimen.     6:17 PM: I updated Dr. Krueger on the plan. He is comfortable with the pt returning home if US results are normal.     8:52 PM Recheck: I rechecked on Marcello Umana who is resting comfortably in bed. I updated the pt on his ED results. His US shows no evidence for blood clots, arterial flow is stable . Will prescribe him a short course of pain medicine for sx relief. I instructed Marcello Umana to f/u with his PCP in the next couple days for further evaluation as needed. Pt will be having a home nurse see him every other day starting on Monday. I educated the pt on emergency return precautions and to return to the ED should he develop any new or worsening sx. Marcello Umana voiced understanding and agreed with the plan. All questions were addressed.    [facility]  Critical Care time spent on this patient outside of billable procedures:  None    Clinical Impression  ED Diagnoses        Final diagnoses    Left leg pain          Open wound of left lower extremity, subsequent encounter              The patient was provided with a recommendation to follow up with a  primary care provider and obtain reassessment of his/her blood pressure within three months.    Follow Up:  John Bill MD  [address]  [address] [address]  [phone]      As needed          Summary of your Discharge Medications      Take these Medications      Details   oxyCODONE (IMM REL) 5 MG immediate release tablet  Commonly known as:  ROXICODONE   Take 1 tablet by mouth every 4 hours as needed for Pain.            Pt is discharged to home/self care in stable condition.       I have reviewed the information recorded by the scribe for accuracy and agree with its contents.    ________________________________________________________________  __    Yemi Martinez acting as the scribe for Dr. Ara Nguyen  IDX # [id number]  Scribe: Yemi Nguyen MD  10/25/19 6498

## 2019-11-11 ENCOUNTER — APPOINTMENT (OUTPATIENT)
Dept: NEUROLOGY | Facility: CLINIC | Age: 49
End: 2019-11-11
Payer: COMMERCIAL

## 2019-11-11 VITALS
HEART RATE: 89 BPM | HEIGHT: 65 IN | DIASTOLIC BLOOD PRESSURE: 80 MMHG | BODY MASS INDEX: 37.49 KG/M2 | WEIGHT: 225 LBS | SYSTOLIC BLOOD PRESSURE: 120 MMHG

## 2019-11-11 DIAGNOSIS — Z87.891 PERSONAL HISTORY OF NICOTINE DEPENDENCE: ICD-10-CM

## 2019-11-11 PROCEDURE — 99203 OFFICE O/P NEW LOW 30 MIN: CPT

## 2019-11-11 RX ORDER — ZOLPIDEM TARTRATE 10 MG/1
10 TABLET ORAL
Qty: 30 | Refills: 0 | Status: ACTIVE | COMMUNITY
Start: 2019-10-07

## 2019-11-11 NOTE — PHYSICAL EXAM
[FreeTextEntry1] : a+Ox3 language and attention normal\par CN 2-12 normal\par power 5/5 except in left SA 4/5 and L-arm flexion 4+/5 and Left finger extension 4+/5, Arm extension 5-/5\par Sensory is decreased in left C5/C6/ c7\par DTR 1+ on RUE and diminshed on LUE\par FTN NL\par gait normal\par

## 2019-11-11 NOTE — HISTORY OF PRESENT ILLNESS
[FreeTextEntry1] : Ms. Denise is a 50yo woman here for headaches and mostly neck pain.  she was seen by me back in Sept 2016 for similar complaints and had an MRI cervical spine done showing varying degrees of mild, moderate/severe spinal stenosis and radiculopathy.  Since then she has developed more significant neck pain and also headaches frequently.  When she goes to the salon to get her hair washed she develops severe neck pain and recently she had episodes of severe pain shooting up the right side of her neck.  she has also noticed weakness in her left arm and believes it is because she is not exercising.  She had CTSx in left hand and is planning on having surgery for the right wrist.\par She is also being followed for a lung nodule and has a ct chest tomorrow\par

## 2019-11-11 NOTE — DISCUSSION/SUMMARY
[FreeTextEntry1] : Ms. Denise is a 48yo woman with headaches and severe neck pain with weakness in left UE suggestive of severe radicular disease vs stenosis\par 1. flex/ext xray cervical spine\par 2. MRI cervical spine\par 3. Neurosurgery evaluation\par 4. Will follow post imaging studies

## 2019-11-11 NOTE — REVIEW OF SYSTEMS
[Tingling] : tingling [Arthralgias] : arthralgias [Joint Pain] : joint pain [Joint Stiffness] : joint stiffness [Negative] : Endocrine

## 2019-11-12 ENCOUNTER — OUTPATIENT (OUTPATIENT)
Dept: OUTPATIENT SERVICES | Facility: HOSPITAL | Age: 49
LOS: 1 days | Discharge: HOME | End: 2019-11-12
Payer: COMMERCIAL

## 2019-11-12 DIAGNOSIS — R91.8 OTHER NONSPECIFIC ABNORMAL FINDING OF LUNG FIELD: ICD-10-CM

## 2019-11-12 DIAGNOSIS — M54.12 RADICULOPATHY, CERVICAL REGION: ICD-10-CM

## 2019-11-12 PROCEDURE — 72050 X-RAY EXAM NECK SPINE 4/5VWS: CPT | Mod: 26

## 2019-11-12 PROCEDURE — 71250 CT THORAX DX C-: CPT | Mod: 26

## 2020-01-27 ENCOUNTER — APPOINTMENT (OUTPATIENT)
Dept: NEUROLOGY | Facility: CLINIC | Age: 50
End: 2020-01-27

## 2020-04-25 ENCOUNTER — MESSAGE (OUTPATIENT)
Age: 50
End: 2020-04-25

## 2020-05-07 ENCOUNTER — APPOINTMENT (OUTPATIENT)
Dept: DISASTER EMERGENCY | Facility: HOSPITAL | Age: 50
End: 2020-05-07

## 2020-05-08 LAB
SARS-COV-2 IGG SERPL IA-ACNC: 0.1 INDEX
SARS-COV-2 IGG SERPL QL IA: NEGATIVE

## 2020-05-11 ENCOUNTER — APPOINTMENT (OUTPATIENT)
Dept: DISASTER EMERGENCY | Facility: CLINIC | Age: 50
End: 2020-05-11

## 2020-05-18 ENCOUNTER — APPOINTMENT (OUTPATIENT)
Dept: NEUROLOGY | Facility: CLINIC | Age: 50
End: 2020-05-18

## 2020-09-27 ENCOUNTER — TRANSCRIPTION ENCOUNTER (OUTPATIENT)
Age: 50
End: 2020-09-27

## 2020-12-02 ENCOUNTER — OUTPATIENT (OUTPATIENT)
Dept: OUTPATIENT SERVICES | Facility: HOSPITAL | Age: 50
LOS: 1 days | Discharge: HOME | End: 2020-12-02
Payer: COMMERCIAL

## 2020-12-02 DIAGNOSIS — Z12.31 ENCOUNTER FOR SCREENING MAMMOGRAM FOR MALIGNANT NEOPLASM OF BREAST: ICD-10-CM

## 2020-12-02 PROCEDURE — 77063 BREAST TOMOSYNTHESIS BI: CPT | Mod: 26

## 2020-12-02 PROCEDURE — 77067 SCR MAMMO BI INCL CAD: CPT | Mod: 26

## 2021-03-10 DIAGNOSIS — E03.9 HYPOTHYROIDISM, UNSPECIFIED: ICD-10-CM

## 2021-03-10 DIAGNOSIS — Z82.5 FAMILY HISTORY OF ASTHMA AND OTHER CHRONIC LOWER RESPIRATORY DISEASES: ICD-10-CM

## 2021-03-10 DIAGNOSIS — J45.909 UNSPECIFIED ASTHMA, UNCOMPLICATED: ICD-10-CM

## 2021-03-11 PROBLEM — Z00.00 ENCOUNTER FOR PREVENTIVE HEALTH EXAMINATION: Noted: 2021-03-11

## 2021-03-22 DIAGNOSIS — Z87.891 PERSONAL HISTORY OF NICOTINE DEPENDENCE: ICD-10-CM

## 2021-03-22 DIAGNOSIS — Z82.5 FAMILY HISTORY OF ASTHMA AND OTHER CHRONIC LOWER RESPIRATORY DISEASES: ICD-10-CM

## 2021-03-22 DIAGNOSIS — Z80.1 FAMILY HISTORY OF MALIGNANT NEOPLASM OF TRACHEA, BRONCHUS AND LUNG: ICD-10-CM

## 2021-03-22 DIAGNOSIS — E03.9 HYPOTHYROIDISM, UNSPECIFIED: ICD-10-CM

## 2021-03-22 RX ORDER — MULTIVITAMIN
TABLET ORAL
Refills: 0 | Status: ACTIVE | COMMUNITY

## 2021-03-22 RX ORDER — THYROID, PORCINE 90 MG/1
TABLET ORAL
Refills: 0 | Status: ACTIVE | COMMUNITY

## 2021-04-06 ENCOUNTER — APPOINTMENT (OUTPATIENT)
Dept: PULMONOLOGY | Facility: CLINIC | Age: 51
End: 2021-04-06

## 2021-04-20 ENCOUNTER — APPOINTMENT (OUTPATIENT)
Dept: PULMONOLOGY | Facility: CLINIC | Age: 51
End: 2021-04-20

## 2021-06-16 ENCOUNTER — OUTPATIENT (OUTPATIENT)
Dept: OUTPATIENT SERVICES | Facility: HOSPITAL | Age: 51
LOS: 1 days | Discharge: HOME | End: 2021-06-16
Payer: COMMERCIAL

## 2021-06-16 DIAGNOSIS — R91.1 SOLITARY PULMONARY NODULE: ICD-10-CM

## 2021-06-16 DIAGNOSIS — R10.9 UNSPECIFIED ABDOMINAL PAIN: ICD-10-CM

## 2021-06-16 PROCEDURE — 76830 TRANSVAGINAL US NON-OB: CPT | Mod: 26

## 2021-06-16 PROCEDURE — 76856 US EXAM PELVIC COMPLETE: CPT | Mod: 26

## 2021-06-16 PROCEDURE — 71250 CT THORAX DX C-: CPT | Mod: 26

## 2021-07-08 ENCOUNTER — APPOINTMENT (OUTPATIENT)
Dept: PULMONOLOGY | Facility: CLINIC | Age: 51
End: 2021-07-08

## 2021-08-11 ENCOUNTER — OUTPATIENT (OUTPATIENT)
Dept: OUTPATIENT SERVICES | Facility: HOSPITAL | Age: 51
LOS: 1 days | Discharge: HOME | End: 2021-08-11

## 2021-08-11 VITALS
DIASTOLIC BLOOD PRESSURE: 51 MMHG | RESPIRATION RATE: 18 BRPM | TEMPERATURE: 99 F | SYSTOLIC BLOOD PRESSURE: 114 MMHG | HEIGHT: 65 IN | WEIGHT: 220.02 LBS | OXYGEN SATURATION: 100 % | HEART RATE: 86 BPM

## 2021-08-11 VITALS
OXYGEN SATURATION: 99 % | RESPIRATION RATE: 19 BRPM | DIASTOLIC BLOOD PRESSURE: 60 MMHG | SYSTOLIC BLOOD PRESSURE: 108 MMHG | HEART RATE: 85 BPM | TEMPERATURE: 99 F

## 2021-08-11 RX ORDER — THYROID 120 MG
86 TABLET ORAL
Qty: 0 | Refills: 0 | DISCHARGE

## 2021-08-11 RX ORDER — THYROID 120 MG
1 TABLET ORAL
Qty: 0 | Refills: 0 | DISCHARGE

## 2021-08-11 RX ORDER — IBUPROFEN 200 MG
1 TABLET ORAL
Qty: 20 | Refills: 0
Start: 2021-08-11

## 2021-08-11 NOTE — BRIEF OPERATIVE NOTE - NSICDXBRIEFPOSTOP_GEN_ALL_CORE_FT
Subjective:       Patient ID: Cecilia Munguia is a 70 y.o. female.    Chief Complaint: Foot Pain and Fall    Pt c/o L swelling that started 4 weeks ago after falling when traveling in [place]. Saw ortho last week and had LLE doppler that was neg for DVT. Also had xray that was neg for fx. She is having some swelling in LLE now, especially around ankle. However, this remains without pain. No associated sob/pnd/orthopnea. She tried putting on compression stocking but difficult to do so with her limited hand use due to CIDP. She has used ace bandage which helps. She is able to walk without an issue but her ortho told her to minimize activity if able as he believes the swelling is related to ankle injury.       Review of Systems   Constitutional: Negative for fever and unexpected weight change.   Respiratory: Negative for shortness of breath.    Cardiovascular: Positive for leg swelling. Negative for chest pain and palpitations.   Psychiatric/Behavioral: Negative for dysphoric mood.       Objective:      Physical Exam   Constitutional: She is oriented to person, place, and time. She appears well-developed and well-nourished.   Neck: Neck supple. No thyromegaly present.   Cardiovascular: Normal rate, regular rhythm and normal heart sounds.    Pulmonary/Chest: Effort normal and breath sounds normal.   Musculoskeletal: She exhibits edema.   Swelling around L ankle both medial and lateral; minimal swelling extending into lower leg.    Lymphadenopathy:     She has no cervical adenopathy.   Neurological: She is alert and oriented to person, place, and time.   Psychiatric: She has a normal mood and affect. Her behavior is normal.       Assessment:       1. Swelling of left lower extremity        Plan:       1. Suspect ankle injury; continue to use ace wrap and keep elevated; adhere to ortho instructions for activity restriction  2. RTC and ED prompts d/w pt and she understood      POST-OP DIAGNOSIS:  Right carpal tunnel syndrome 11-Aug-2021 15:00:33  Domenico Ramirez

## 2021-08-11 NOTE — ASU DISCHARGE PLAN (ADULT/PEDIATRIC) - ASU DC SPECIAL INSTRUCTIONSFT

## 2021-08-17 ENCOUNTER — OUTPATIENT (OUTPATIENT)
Dept: OUTPATIENT SERVICES | Facility: HOSPITAL | Age: 51
LOS: 1 days | Discharge: HOME | End: 2021-08-17
Payer: COMMERCIAL

## 2021-08-17 DIAGNOSIS — E03.9 HYPOTHYROIDISM, UNSPECIFIED: ICD-10-CM

## 2021-08-17 DIAGNOSIS — G56.01 CARPAL TUNNEL SYNDROME, RIGHT UPPER LIMB: ICD-10-CM

## 2021-08-17 DIAGNOSIS — D39.11 NEOPLASM OF UNCERTAIN BEHAVIOR OF RIGHT OVARY: ICD-10-CM

## 2021-08-17 DIAGNOSIS — R10.2 PELVIC AND PERINEAL PAIN: ICD-10-CM

## 2021-08-17 PROCEDURE — 72197 MRI PELVIS W/O & W/DYE: CPT | Mod: 26

## 2021-12-13 ENCOUNTER — OUTPATIENT (OUTPATIENT)
Dept: OUTPATIENT SERVICES | Facility: HOSPITAL | Age: 51
LOS: 1 days | Discharge: HOME | End: 2021-12-13
Payer: COMMERCIAL

## 2021-12-13 DIAGNOSIS — Z12.31 ENCOUNTER FOR SCREENING MAMMOGRAM FOR MALIGNANT NEOPLASM OF BREAST: ICD-10-CM

## 2021-12-13 PROCEDURE — 77063 BREAST TOMOSYNTHESIS BI: CPT | Mod: 26

## 2021-12-13 PROCEDURE — 77067 SCR MAMMO BI INCL CAD: CPT | Mod: 26

## 2022-01-12 ENCOUNTER — OUTPATIENT (OUTPATIENT)
Dept: OUTPATIENT SERVICES | Facility: HOSPITAL | Age: 52
LOS: 1 days | Discharge: HOME | End: 2022-01-12
Payer: COMMERCIAL

## 2022-01-12 DIAGNOSIS — U07.1 COVID-19: ICD-10-CM

## 2022-01-12 PROCEDURE — 71046 X-RAY EXAM CHEST 2 VIEWS: CPT | Mod: 26

## 2022-03-10 ENCOUNTER — APPOINTMENT (OUTPATIENT)
Dept: NEUROLOGY | Facility: CLINIC | Age: 52
End: 2022-03-10
Payer: COMMERCIAL

## 2022-03-10 DIAGNOSIS — R51.9 HEADACHE, UNSPECIFIED: ICD-10-CM

## 2022-03-10 PROCEDURE — 99214 OFFICE O/P EST MOD 30 MIN: CPT

## 2022-03-10 NOTE — REVIEW OF SYSTEMS
[Tingling] : tingling [Arthralgias] : arthralgias [Joint Pain] : joint pain [Joint Stiffness] : joint stiffness [Negative] : Heme/Lymph

## 2022-03-11 NOTE — HISTORY OF PRESENT ILLNESS
[FreeTextEntry1] : Ms. Denise is a 50yo woman here for headaches and mostly neck pain.  she was seen by me back in Nov 2019 for similar complaints and had an MRI cervical spine done showing varying degrees of mild, moderate/severe spinal stenosis and radiculopathy.  Since then she has developed more significant neck pain and also headaches frequently.  She has also noticed increased weakness in her left arm and believes it is because she is not exercising.  She had CTSx in left hand a few years ago\par \par

## 2022-03-11 NOTE — PHYSICAL EXAM
[FreeTextEntry1] : a+Ox3 language and attention normal\par CN 2-12 normal\par power 5/5 except in left SA 4/5 and L-arm flexion 4+/5 and Left finger extension 4+/5, Arm extension 5-/5, L-HF 5-/5 and L-KF 5-/5\par Sensory is decreased in left C5/C6/ c7\par DTR 1+ on RUE and diminshed on LUE and LLE\par FTN NL\par gait normal\par
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
## 2022-04-25 ENCOUNTER — APPOINTMENT (OUTPATIENT)
Age: 52
End: 2022-04-25
Payer: COMMERCIAL

## 2022-04-25 VITALS
HEIGHT: 65 IN | WEIGHT: 215 LBS | BODY MASS INDEX: 35.82 KG/M2 | OXYGEN SATURATION: 100 % | HEART RATE: 80 BPM | SYSTOLIC BLOOD PRESSURE: 126 MMHG | DIASTOLIC BLOOD PRESSURE: 76 MMHG | RESPIRATION RATE: 14 BRPM

## 2022-04-25 DIAGNOSIS — R91.1 SOLITARY PULMONARY NODULE: ICD-10-CM

## 2022-04-25 DIAGNOSIS — J45.909 UNSPECIFIED ASTHMA, UNCOMPLICATED: ICD-10-CM

## 2022-04-25 DIAGNOSIS — D3A.00 BENIGN CARCINOID TUMOR OF UNSPECIFIED SITE: ICD-10-CM

## 2022-04-25 PROCEDURE — 99213 OFFICE O/P EST LOW 20 MIN: CPT

## 2022-04-25 NOTE — DISCUSSION/SUMMARY
[FreeTextEntry1] : AMINTA NODULE STABLE SUSPECT  CARCINOID CHEST CT 6/ 21 REVIEWED REFER TO KINSEY\sheba WALDROP COVID BETTER\par PRIOR RECORD REVIEWED

## 2022-04-25 NOTE — HISTORY OF PRESENT ILLNESS
[TextBox_4] : CHEST CT 6/21 STABLE AMINTA NODULE, SAW SKYE PRIOR\par SP COVID FEW MONTH AGO, FEELS BETTER

## 2022-05-10 ENCOUNTER — APPOINTMENT (OUTPATIENT)
Dept: CARDIOTHORACIC SURGERY | Facility: CLINIC | Age: 52
End: 2022-05-10

## 2022-05-23 ENCOUNTER — NON-APPOINTMENT (OUTPATIENT)
Age: 52
End: 2022-05-23

## 2022-05-24 ENCOUNTER — APPOINTMENT (OUTPATIENT)
Dept: CARDIOTHORACIC SURGERY | Facility: CLINIC | Age: 52
End: 2022-05-24

## 2022-05-31 ENCOUNTER — APPOINTMENT (OUTPATIENT)
Dept: CARDIOTHORACIC SURGERY | Facility: CLINIC | Age: 52
End: 2022-05-31

## 2022-06-27 ENCOUNTER — RESULT REVIEW (OUTPATIENT)
Age: 52
End: 2022-06-27

## 2022-06-27 ENCOUNTER — OUTPATIENT (OUTPATIENT)
Dept: OUTPATIENT SERVICES | Facility: HOSPITAL | Age: 52
LOS: 1 days | Discharge: HOME | End: 2022-06-27

## 2022-06-27 DIAGNOSIS — M54.2 CERVICALGIA: ICD-10-CM

## 2022-06-27 DIAGNOSIS — M54.12 RADICULOPATHY, CERVICAL REGION: ICD-10-CM

## 2022-06-27 DIAGNOSIS — R51.9 HEADACHE, UNSPECIFIED: ICD-10-CM

## 2022-06-27 PROCEDURE — 72141 MRI NECK SPINE W/O DYE: CPT | Mod: 26

## 2022-06-27 PROCEDURE — 70551 MRI BRAIN STEM W/O DYE: CPT | Mod: 26

## 2022-06-30 ENCOUNTER — OUTPATIENT (OUTPATIENT)
Dept: OUTPATIENT SERVICES | Facility: HOSPITAL | Age: 52
LOS: 1 days | Discharge: HOME | End: 2022-06-30

## 2022-06-30 ENCOUNTER — RESULT REVIEW (OUTPATIENT)
Age: 52
End: 2022-06-30

## 2022-06-30 DIAGNOSIS — R91.8 OTHER NONSPECIFIC ABNORMAL FINDING OF LUNG FIELD: ICD-10-CM

## 2022-06-30 DIAGNOSIS — E04.1 NONTOXIC SINGLE THYROID NODULE: ICD-10-CM

## 2022-06-30 PROCEDURE — 71250 CT THORAX DX C-: CPT | Mod: 26

## 2022-06-30 PROCEDURE — 76536 US EXAM OF HEAD AND NECK: CPT | Mod: 26

## 2022-07-07 ENCOUNTER — OUTPATIENT (OUTPATIENT)
Dept: OUTPATIENT SERVICES | Facility: HOSPITAL | Age: 52
LOS: 1 days | Discharge: HOME | End: 2022-07-07

## 2022-07-07 DIAGNOSIS — D17.9 BENIGN LIPOMATOUS NEOPLASM, UNSPECIFIED: ICD-10-CM

## 2022-07-07 PROCEDURE — 76882 US LMTD JT/FCL EVL NVASC XTR: CPT | Mod: 26,LT

## 2022-07-23 ENCOUNTER — NON-APPOINTMENT (OUTPATIENT)
Age: 52
End: 2022-07-23

## 2022-08-16 ENCOUNTER — OUTPATIENT (OUTPATIENT)
Dept: OUTPATIENT SERVICES | Facility: HOSPITAL | Age: 52
LOS: 1 days | Discharge: HOME | End: 2022-08-16

## 2022-08-16 DIAGNOSIS — N64.4 MASTODYNIA: ICD-10-CM

## 2022-08-16 PROCEDURE — 76641 ULTRASOUND BREAST COMPLETE: CPT | Mod: 26,50

## 2022-08-16 PROCEDURE — 77066 DX MAMMO INCL CAD BI: CPT | Mod: 26

## 2022-08-16 PROCEDURE — 77062 BREAST TOMOSYNTHESIS BI: CPT | Mod: 26

## 2022-09-27 ENCOUNTER — APPOINTMENT (OUTPATIENT)
Dept: NEUROLOGY | Facility: CLINIC | Age: 52
End: 2022-09-27

## 2022-09-27 VITALS
DIASTOLIC BLOOD PRESSURE: 76 MMHG | TEMPERATURE: 98 F | BODY MASS INDEX: 36.15 KG/M2 | OXYGEN SATURATION: 98 % | SYSTOLIC BLOOD PRESSURE: 114 MMHG | WEIGHT: 217 LBS | HEIGHT: 65 IN | HEART RATE: 86 BPM

## 2022-09-27 DIAGNOSIS — M16.10 UNILATERAL PRIMARY OSTEOARTHRITIS, UNSPECIFIED HIP: ICD-10-CM

## 2022-09-27 PROCEDURE — 99214 OFFICE O/P EST MOD 30 MIN: CPT

## 2022-09-27 RX ORDER — TRIAMCINOLONE ACETONIDE 5 MG/G
0.5 CREAM TOPICAL
Qty: 15 | Refills: 0 | Status: DISCONTINUED | COMMUNITY
Start: 2019-08-19 | End: 2022-09-27

## 2022-09-27 RX ORDER — TIZANIDINE 4 MG/1
4 TABLET ORAL
Qty: 60 | Refills: 0 | Status: DISCONTINUED | COMMUNITY
Start: 2019-05-20 | End: 2022-09-27

## 2022-09-27 RX ORDER — AZELASTINE HYDROCHLORIDE 137 UG/1
137 SPRAY, METERED NASAL
Qty: 30 | Refills: 0 | Status: DISCONTINUED | COMMUNITY
Start: 2019-06-20 | End: 2022-09-27

## 2022-09-27 RX ORDER — MULTIVITAMIN
TABLET ORAL
Refills: 0 | Status: DISCONTINUED | COMMUNITY
End: 2022-09-27

## 2022-09-27 RX ORDER — AMOXICILLIN AND CLAVULANATE POTASSIUM 875; 125 MG/1; MG/1
875-125 TABLET, COATED ORAL
Qty: 20 | Refills: 0 | Status: DISCONTINUED | COMMUNITY
Start: 2019-05-28 | End: 2022-09-27

## 2022-09-27 RX ORDER — PREDNISONE 20 MG/1
20 TABLET ORAL
Qty: 6 | Refills: 0 | Status: DISCONTINUED | COMMUNITY
Start: 2019-05-30 | End: 2022-09-27

## 2022-09-27 RX ORDER — THYROID, PORCINE 90 MG/1
90 TABLET ORAL DAILY
Refills: 0 | Status: DISCONTINUED | COMMUNITY
Start: 2019-07-10 | End: 2022-09-27

## 2022-09-27 RX ORDER — LIRAGLUTIDE 6 MG/ML
18 INJECTION, SOLUTION SUBCUTANEOUS
Qty: 45 | Refills: 0 | Status: DISCONTINUED | COMMUNITY
Start: 2019-05-16 | End: 2022-09-27

## 2022-09-27 RX ORDER — NAPROXEN 500 MG/1
500 TABLET ORAL
Qty: 60 | Refills: 0 | Status: DISCONTINUED | COMMUNITY
Start: 2019-11-07 | End: 2022-09-27

## 2022-09-27 RX ORDER — THYROID, PORCINE 15 MG/1
15 TABLET ORAL DAILY
Refills: 0 | Status: DISCONTINUED | COMMUNITY
Start: 2019-07-10 | End: 2022-09-27

## 2022-09-27 RX ORDER — PEN NEEDLE, DIABETIC 29 G X1/2"
32G X 4 MM NEEDLE, DISPOSABLE MISCELLANEOUS
Qty: 100 | Refills: 0 | Status: DISCONTINUED | COMMUNITY
Start: 2019-01-08 | End: 2022-09-27

## 2022-09-27 NOTE — HISTORY OF PRESENT ILLNESS
[FreeTextEntry1] : Ms. Denise is a 51yo woman here for headaches and mostly neck pain.  she was seen by me back in 3/10/2022 for similar complaints and had an MRI cervical spine done showing varying degrees of mild, moderate/severe spinal stenosis and radiculopathy.  Since then she has developed more significant neck pain and also headaches frequently.  She has also noticed increased weakness in her left arm and believes it is because she is not exercising.  She had CTSx in left hand a few years ago\par \par Since last visit she had MRI brain (unremarkable and MRI cervical spine (which showed worsening disease with moderate to severe foraminal narrowing).  She has been doing PT with some improvement but still continuous pain in the neck and headaches.  I referred to neurosurgery for evaluation however she did not make an appointment.\par \par She is also having pain in the right hip lower back region\par \par

## 2022-09-27 NOTE — PHYSICAL EXAM
[FreeTextEntry1] : a+Ox3 language and attention normal\par CN 2-12 normal\par power 5/5 except in left SA 4+/5 and L-arm flexion 4+/5 and Left finger extension 4+/5, Arm extension 5-/5, L-HF 5-/5 and L-KF 5-/5\par Sensory is decreased in left C5/C6/ c7\par DTR 1+ on RUE and diminshed on LUE and LLE\par FTN NL\par gait normal\par
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
## 2022-11-16 ENCOUNTER — OUTPATIENT (OUTPATIENT)
Dept: OUTPATIENT SERVICES | Facility: HOSPITAL | Age: 52
LOS: 1 days | Discharge: HOME | End: 2022-11-16

## 2022-11-16 DIAGNOSIS — R51.9 HEADACHE, UNSPECIFIED: ICD-10-CM

## 2022-11-16 PROCEDURE — 70260 X-RAY EXAM OF SKULL: CPT | Mod: 26

## 2023-05-12 ENCOUNTER — RESULT REVIEW (OUTPATIENT)
Age: 53
End: 2023-05-12

## 2023-05-12 ENCOUNTER — OUTPATIENT (OUTPATIENT)
Dept: OUTPATIENT SERVICES | Facility: HOSPITAL | Age: 53
LOS: 1 days | End: 2023-05-12
Payer: COMMERCIAL

## 2023-05-12 DIAGNOSIS — R07.9 CHEST PAIN, UNSPECIFIED: ICD-10-CM

## 2023-05-12 PROCEDURE — 71046 X-RAY EXAM CHEST 2 VIEWS: CPT | Mod: 26

## 2023-05-12 PROCEDURE — 71046 X-RAY EXAM CHEST 2 VIEWS: CPT

## 2023-05-13 DIAGNOSIS — R07.9 CHEST PAIN, UNSPECIFIED: ICD-10-CM

## 2023-06-14 ENCOUNTER — APPOINTMENT (OUTPATIENT)
Dept: GASTROENTEROLOGY | Facility: CLINIC | Age: 53
End: 2023-06-14

## 2023-07-14 ENCOUNTER — RESULT REVIEW (OUTPATIENT)
Age: 53
End: 2023-07-14

## 2023-07-14 ENCOUNTER — OUTPATIENT (OUTPATIENT)
Dept: OUTPATIENT SERVICES | Facility: HOSPITAL | Age: 53
LOS: 1 days | End: 2023-07-14
Payer: COMMERCIAL

## 2023-07-14 DIAGNOSIS — Z00.8 ENCOUNTER FOR OTHER GENERAL EXAMINATION: ICD-10-CM

## 2023-07-14 DIAGNOSIS — R91.1 SOLITARY PULMONARY NODULE: ICD-10-CM

## 2023-07-14 PROCEDURE — 71250 CT THORAX DX C-: CPT | Mod: 26

## 2023-07-14 PROCEDURE — 71250 CT THORAX DX C-: CPT

## 2023-07-15 DIAGNOSIS — R91.1 SOLITARY PULMONARY NODULE: ICD-10-CM

## 2023-09-06 ENCOUNTER — APPOINTMENT (OUTPATIENT)
Dept: GASTROENTEROLOGY | Facility: CLINIC | Age: 53
End: 2023-09-06
Payer: COMMERCIAL

## 2023-09-06 VITALS
HEIGHT: 65 IN | BODY MASS INDEX: 38.19 KG/M2 | WEIGHT: 229.2 LBS | HEART RATE: 82 BPM | SYSTOLIC BLOOD PRESSURE: 112 MMHG | DIASTOLIC BLOOD PRESSURE: 71 MMHG

## 2023-09-06 DIAGNOSIS — Z86.39 PERSONAL HISTORY OF OTHER ENDOCRINE, NUTRITIONAL AND METABOLIC DISEASE: ICD-10-CM

## 2023-09-06 DIAGNOSIS — Z86.69 PERSONAL HISTORY OF OTHER DISEASES OF THE NERVOUS SYSTEM AND SENSE ORGANS: ICD-10-CM

## 2023-09-06 DIAGNOSIS — Z87.898 PERSONAL HISTORY OF OTHER SPECIFIED CONDITIONS: ICD-10-CM

## 2023-09-06 DIAGNOSIS — Z87.19 PERSONAL HISTORY OF OTHER DISEASES OF THE DIGESTIVE SYSTEM: ICD-10-CM

## 2023-09-06 DIAGNOSIS — R10.11 RIGHT UPPER QUADRANT PAIN: ICD-10-CM

## 2023-09-06 DIAGNOSIS — Z87.09 PERSONAL HISTORY OF OTHER DISEASES OF THE RESPIRATORY SYSTEM: ICD-10-CM

## 2023-09-06 DIAGNOSIS — Z78.9 OTHER SPECIFIED HEALTH STATUS: ICD-10-CM

## 2023-09-06 DIAGNOSIS — K60.3 ANAL FISTULA: ICD-10-CM

## 2023-09-06 DIAGNOSIS — Z87.42 PERSONAL HISTORY OF OTHER DISEASES OF THE FEMALE GENITAL TRACT: ICD-10-CM

## 2023-09-06 DIAGNOSIS — K80.20 CALCULUS OF GALLBLADDER W/OUT CHOLECYSTITIS W/OUT OBSTRUCTION: ICD-10-CM

## 2023-09-06 PROCEDURE — 99203 OFFICE O/P NEW LOW 30 MIN: CPT

## 2023-09-06 RX ORDER — SODIUM PICOSULFATE, MAGNESIUM OXIDE, AND ANHYDROUS CITRIC ACID 10; 3.5; 12 MG/160ML; G/160ML; G/160ML
10-3.5-12 MG-GM LIQUID ORAL
Qty: 2 | Refills: 0 | Status: ACTIVE | COMMUNITY
Start: 2023-09-06 | End: 1900-01-01

## 2023-09-06 NOTE — HISTORY OF PRESENT ILLNESS
[FreeTextEntry1] : Patient is a 53-year-old female with past medical history of hypothyroidism who presents for follow-up.  Patient was seen prior for gallbladder sludge and stones and would like to get an abdominal ultrasound.  Patient notes at times that she does get some abdominal tenderness but usually when she eats very bad foods.  Patient was referred to surgery in the past but declined.  Patient is also due for his colonoscopy as has never had prior screening.

## 2023-09-06 NOTE — PHYSICAL EXAM

## 2023-09-06 NOTE — ASSESSMENT
[FreeTextEntry1] : Patient is a 53-year-old female with past medical history of hypothyroidism who presents for follow-up.  Patient was seen prior for gallbladder sludge and stones and would like to get an abdominal ultrasound.  Patient notes at times that she does get some abdominal tenderness but usually when she eats very bad foods.  Patient was referred to surgery in the past but declined.  Patient is also due for his colonoscopy as has never had prior screening.  Gallstones Declined surgery Abdominal U/S   Abdominal Pain Setup for EGD and Colonoscopy   Clenpiq

## 2023-09-25 ENCOUNTER — APPOINTMENT (OUTPATIENT)
Dept: NEUROLOGY | Facility: CLINIC | Age: 53
End: 2023-09-25

## 2023-11-15 ENCOUNTER — OUTPATIENT (OUTPATIENT)
Dept: OUTPATIENT SERVICES | Facility: HOSPITAL | Age: 53
LOS: 1 days | End: 2023-11-15
Payer: COMMERCIAL

## 2023-11-15 DIAGNOSIS — Z00.8 ENCOUNTER FOR OTHER GENERAL EXAMINATION: ICD-10-CM

## 2023-11-15 DIAGNOSIS — R10.9 UNSPECIFIED ABDOMINAL PAIN: ICD-10-CM

## 2023-11-15 DIAGNOSIS — R10.2 PELVIC AND PERINEAL PAIN: ICD-10-CM

## 2023-11-15 PROCEDURE — 76830 TRANSVAGINAL US NON-OB: CPT

## 2023-11-15 PROCEDURE — 76700 US EXAM ABDOM COMPLETE: CPT

## 2023-11-15 PROCEDURE — 76830 TRANSVAGINAL US NON-OB: CPT | Mod: 26

## 2023-11-15 PROCEDURE — 76700 US EXAM ABDOM COMPLETE: CPT | Mod: 26

## 2023-11-15 PROCEDURE — 76856 US EXAM PELVIC COMPLETE: CPT | Mod: 26

## 2023-11-15 PROCEDURE — 76856 US EXAM PELVIC COMPLETE: CPT

## 2023-11-16 DIAGNOSIS — R10.2 PELVIC AND PERINEAL PAIN: ICD-10-CM

## 2023-11-16 DIAGNOSIS — R10.9 UNSPECIFIED ABDOMINAL PAIN: ICD-10-CM

## 2023-12-22 ENCOUNTER — OUTPATIENT (OUTPATIENT)
Dept: OUTPATIENT SERVICES | Facility: HOSPITAL | Age: 53
LOS: 1 days | End: 2023-12-22
Payer: COMMERCIAL

## 2023-12-22 DIAGNOSIS — N64.4 MASTODYNIA: ICD-10-CM

## 2023-12-22 DIAGNOSIS — Z80.3 FAMILY HISTORY OF MALIGNANT NEOPLASM OF BREAST: ICD-10-CM

## 2023-12-22 PROCEDURE — 76642 ULTRASOUND BREAST LIMITED: CPT | Mod: 50

## 2023-12-22 PROCEDURE — 77062 BREAST TOMOSYNTHESIS BI: CPT | Mod: 26

## 2023-12-22 PROCEDURE — 77066 DX MAMMO INCL CAD BI: CPT

## 2023-12-22 PROCEDURE — 77066 DX MAMMO INCL CAD BI: CPT | Mod: 26

## 2023-12-22 PROCEDURE — 76642 ULTRASOUND BREAST LIMITED: CPT | Mod: 26,50

## 2023-12-22 PROCEDURE — G0279: CPT

## 2023-12-23 DIAGNOSIS — N64.4 MASTODYNIA: ICD-10-CM

## 2024-01-12 ENCOUNTER — APPOINTMENT (OUTPATIENT)
Dept: NEUROLOGY | Facility: CLINIC | Age: 54
End: 2024-01-12
Payer: COMMERCIAL

## 2024-01-12 VITALS
HEART RATE: 78 BPM | DIASTOLIC BLOOD PRESSURE: 72 MMHG | TEMPERATURE: 98 F | WEIGHT: 229 LBS | SYSTOLIC BLOOD PRESSURE: 106 MMHG | HEIGHT: 65 IN | BODY MASS INDEX: 38.15 KG/M2 | OXYGEN SATURATION: 98 %

## 2024-01-12 DIAGNOSIS — M54.12 RADICULOPATHY, CERVICAL REGION: ICD-10-CM

## 2024-01-12 DIAGNOSIS — M54.16 RADICULOPATHY, LUMBAR REGION: ICD-10-CM

## 2024-01-12 PROCEDURE — 99214 OFFICE O/P EST MOD 30 MIN: CPT

## 2024-01-12 NOTE — PHYSICAL EXAM
[FreeTextEntry1] : a+Ox3 language and attention normal CN 2-12 normal power 5/5 except in left SA 4+/5 and L-arm flexion 4+/5 and Left finger extension 4+/5, Arm extension 5-/5, L-HF 5-/5 and L-KF 5-/5 Sensory is decreased in left C5/C6/ c7 DTR 2+ on LUE and diminshed on RUE FTN NL gait normal Limited rotation in the neck b/l

## 2024-01-12 NOTE — HISTORY OF PRESENT ILLNESS
[FreeTextEntry1] : Since last visit she has continued to have cervical spine pain.  She has noticed limited mobility in her neck and has to turn around to talk to people and when driving. Has not noticed much weakness over the last year  From last visit on 9/27/2022: Ms. Denise is a 52yo woman here for headaches and mostly neck pain.  she was seen by me back in 3/10/2022 for similar complaints and had an MRI cervical spine done showing varying degrees of mild, moderate/severe spinal stenosis and radiculopathy.  Since then she has developed more significant neck pain and also headaches frequently.  She has also noticed increased weakness in her left arm and believes it is because she is not exercising.  She had CTSx in left hand a few years ago  Since last visit she had MRI brain (unremarkable and MRI cervical spine (which showed worsening disease with moderate to severe foraminal narrowing).  She has been doing PT with some improvement but still continuous pain in the neck and headaches.  I referred to neurosurgery for evaluation however she did not make an appointment.  She is also having pain in the right hip lower back region

## 2024-01-12 NOTE — DISCUSSION/SUMMARY
[FreeTextEntry1] : Ms. Denise is a 52yo woman with headaches and severe neck pain with weakness in left UE and recent.  She has been getting frequent HA.   1. MRI cervical and lumbar spine w/o LYNDA 2. Refer to spine surgery for evaluation 3. Continue PT 4. f/u in 6 months

## 2024-01-16 ENCOUNTER — EMERGENCY (EMERGENCY)
Facility: HOSPITAL | Age: 54
LOS: 0 days | Discharge: HOME | End: 2024-01-16
Attending: EMERGENCY MEDICINE
Payer: COMMERCIAL

## 2024-01-16 VITALS
WEIGHT: 205.03 LBS | RESPIRATION RATE: 18 BRPM | DIASTOLIC BLOOD PRESSURE: 64 MMHG | HEIGHT: 65 IN | SYSTOLIC BLOOD PRESSURE: 115 MMHG | HEART RATE: 99 BPM | OXYGEN SATURATION: 96 % | TEMPERATURE: 99 F

## 2024-01-16 DIAGNOSIS — E03.9 HYPOTHYROIDISM, UNSPECIFIED: ICD-10-CM

## 2024-01-16 DIAGNOSIS — Z87.39 PERSONAL HISTORY OF OTHER DISEASES OF THE MUSCULOSKELETAL SYSTEM AND CONNECTIVE TISSUE: ICD-10-CM
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
## 2024-01-16 DIAGNOSIS — M19.90 UNSPECIFIED OSTEOARTHRITIS, UNSPECIFIED SITE: ICD-10-CM

## 2024-01-16 DIAGNOSIS — M54.2 CERVICALGIA: ICD-10-CM

## 2024-01-16 DIAGNOSIS — M54.9 DORSALGIA, UNSPECIFIED: ICD-10-CM

## 2024-01-16 DIAGNOSIS — M25.561 PAIN IN RIGHT KNEE: ICD-10-CM

## 2024-01-16 DIAGNOSIS — S83.91XA SPRAIN OF UNSPECIFIED SITE OF RIGHT KNEE, INITIAL ENCOUNTER: ICD-10-CM

## 2024-01-16 DIAGNOSIS — Z87.828 PERSONAL HISTORY OF OTHER (HEALED) PHYSICAL INJURY AND TRAUMA: ICD-10-CM

## 2024-01-16 DIAGNOSIS — S70.12XA CONTUSION OF LEFT THIGH, INITIAL ENCOUNTER: ICD-10-CM
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
## 2024-01-16 PROCEDURE — 73562 X-RAY EXAM OF KNEE 3: CPT | Mod: RT

## 2024-01-16 PROCEDURE — 99284 EMERGENCY DEPT VISIT MOD MDM: CPT | Mod: 25

## 2024-01-16 PROCEDURE — 72170 X-RAY EXAM OF PELVIS: CPT

## 2024-01-16 PROCEDURE — 72170 X-RAY EXAM OF PELVIS: CPT | Mod: 26

## 2024-01-16 PROCEDURE — 71046 X-RAY EXAM CHEST 2 VIEWS: CPT | Mod: 26

## 2024-01-16 PROCEDURE — 99284 EMERGENCY DEPT VISIT MOD MDM: CPT

## 2024-01-16 PROCEDURE — 71046 X-RAY EXAM CHEST 2 VIEWS: CPT

## 2024-01-16 PROCEDURE — 73562 X-RAY EXAM OF KNEE 3: CPT | Mod: 26,RT

## 2024-01-16 RX ORDER — METHOCARBAMOL 500 MG/1
1 TABLET, FILM COATED ORAL
Qty: 30 | Refills: 0
Start: 2024-01-16

## 2024-01-16 RX ORDER — IBUPROFEN 200 MG
600 TABLET ORAL ONCE
Refills: 0 | Status: COMPLETED | OUTPATIENT
Start: 2024-01-16 | End: 2024-01-16

## 2024-01-16 RX ORDER — IBUPROFEN 200 MG
1 TABLET ORAL
Qty: 30 | Refills: 0
Start: 2024-01-16

## 2024-01-16 RX ADMIN — Medication 600 MILLIGRAM(S): at 22:20
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
## 2024-01-16 NOTE — ED PROVIDER NOTE - INTERNATIONAL TRAVEL
MRI of the right knee demonstrates edema about the patella tendon. This is suggestive of partial tear. We will plan to treat this nonoperatively for now. Hinged ROM brace ordered for right knee, this will be locked in full extension for ambulation and may be at 0-30 for passive range of motion. He will be weightbearing as tolerated.   Appreciate PT/OT    Follow-up in clinic in 2 weeks No

## 2024-01-16 NOTE — ED PROVIDER NOTE - PHYSICAL EXAMINATION
CONSTITUTIONAL: Well-appearing; well-nourished; in no apparent distress.   EYES: PERRL; EOM intact.   CARDIOVASCULAR: Normal S1, S2; no murmurs, rubs, or gallops.   RESPIRATORY: Normal chest excursion with respiration; breath sounds clear and equal bilaterally; no wheezes, rhonchi, or rales.  GI/: Normal bowel sounds; non-distended; non-tender; no palpable organomegaly.   MS: ttp to lateral aspect of right knee. right knee with FROM and no significant effusion. no midline tenderness ot neck and back. mild left sided paraspinal muscle (T and L spine) tenderness. no focal bony or muscle tenderness to upper extremities. upper extremities with FROM and nv intact. left lower leg/knee with non focal muscles tenderness but no joint tenderness and joint effusion appreciated. b/l hips stable without painful ROM. sensation/strength equal  to b/l LE. ambulate with steady gait.   SKIN: + large (~10cm) flat hematoma noted to left posterior upper thigh. + ecchymosis also noted to lateral aspect of right knee.   NEURO/PSYCH: A & O x 4; grossly unremarkable.

## 2024-01-16 NOTE — ED PROVIDER NOTE - CARE PROVIDER_API CALL
Adarsh Benavidez  Orthopaedic Surgery  [address] [place] [address]  Phone: [phone]  Fax: [phone]  Follow Up Time:
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
## 2024-01-16 NOTE — ED PROVIDER NOTE - IV ALTEPLASE EXCL REL HIDDEN
Patient aware of results below.   Aware of NM scan and CT scan ordered. Transferred to  to schedule. Aware will need blood tests on the same day as below.   Patient states no history of diabetic retinopathy    show

## 2024-01-16 NOTE — ED PROVIDER NOTE - NSFOLLOWUPINSTRUCTIONS_ED_ALL_ED_FT
Contusion    Continues R.I.C.E (Rest, Ice, Compression and Elevation) in the next few days. Apply ice 3-4 times a day and 15 minutes a time.    A contusion is a deep bruise. Contusions are the result of a blunt injury to tissues and muscle fibers under the skin. The skin overlying the contusion may turn blue, purple, or yellow. Symptoms also include pain and swelling in the injured area.    SEEK IMMEDIATE MEDICAL CARE IF YOU HAVE THE FOLLOWING SYMPTOMS: severe pain, numbness, tingling, pain, weakness, or skin color/temperature change in any part of your body distal to the fracture.     Knee Pain    WHAT YOU NEED TO KNOW:    Knee pain may start suddenly, or it may be a long-term problem. You may have pain on the side, front, or back of your knee. You may have knee stiffness and swelling. You may hear popping sounds or feel like your knee is giving way or locking up as you walk. You may feel pain when you sit, stand, walk, or climb up and down stairs. Knee pain can be caused by conditions such as obesity, inflammation, or strains or tears in ligaments or tendons.     DISCHARGE INSTRUCTIONS:    Return to the emergency department if:     Your pain is worse, even after treatment.   You cannot bend or straighten your leg completely.   The swelling around your knee does not go down even with treatment.  Your knee is painful and hot to the touch.     Contact your healthcare provider if:     You have questions or concerns about your condition or care.     Medicines: You may need any of the following:     NSAIDs help decrease swelling and pain or fever. This medicine is available with or without a doctor's order. NSAIDs can cause stomach bleeding or kidney problems in certain people. If you take blood thinner medicine, always ask your healthcare provider if NSAIDs are safe for you. Always read the medicine label and follow directions.    Acetaminophen decreases pain and fever. It is available without a doctor's order. Ask how much to take and how often to take it. Follow directions. Read the labels of all other medicines you are using to see if they also contain acetaminophen, or ask your doctor or pharmacist. Acetaminophen can cause liver damage if not taken correctly. Do not use more than 4 grams (4,000 milligrams) total of acetaminophen in one day.     Prescription pain medicine may be given. Ask your healthcare provider how to take this medicine safely. Some prescription pain medicines contain acetaminophen. Do not take other medicines that contain acetaminophen without talking to your healthcare provider. Too much acetaminophen may cause liver damage. Prescription pain medicine may cause constipation. Ask your healthcare provider how to prevent or treat constipation.     Take your medicine as directed. Contact your healthcare provider if you think your medicine is not helping or if you have side effects. Tell him or her if you are allergic to any medicine. Keep a list of the medicines, vitamins, and herbs you take. Include the amounts, and when and why you take them. Bring the list or the pill bottles to follow-up visits. Carry your medicine list with you in case of an emergency.    What you can do to manage your symptoms:     Rest your knee so it can heal. Limit activities that increase your pain. Do low-impact exercises, such as walking or swimming.     Apply ice to help reduce swelling and pain. Use an ice pack, or put crushed ice in a plastic bag. Cover it with a towel before you apply it to your knee. Apply ice for 15 to 20 minutes every hour, or as directed.    Apply compression to help reduce swelling. Use a brace or bandage only as directed.    Elevate your knee to help decrease pain and swelling. Elevate your knee while you are sitting or lying down. Prop your leg on pillows to keep your knee above the level of your heart.    Prevent your knee from moving as directed. Your healthcare provider may put on a cast or splint. You may need to wear a leg brace to stabilize your knee. A leg brace can be adjusted to increase your range of motion as your knee heals.    What you can do to prevent knee pain:   Maintain a healthy weight. Extra weight increases your risk for knee pain. Ask your healthcare provider how much you should weigh. He or she can help you create a safe weight loss plan if you need to lose weight.    Exercise or train properly. Use the correct equipment for sports. Wear shoes that provide good support. Check your posture often as you exercise, play sports, or train for an event. This can help prevent stress and strain on your knees. Rest between sessions so you do not overwork your knees.    Follow up with your healthcare provider within 24 hours or as directed: You may need follow-up treatments, such as steroid injections to decrease pain. Write down your questions so you remember to ask them during your visits.
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
## 2024-01-16 NOTE — ED PROVIDER NOTE - PATIENT PORTAL LINK FT
You can access the FollowMyHealth Patient Portal offered by [facility] by registering at the following website: [email or web address]. By joining [employer]’s FollowMyHealth portal, you will also be able to view your health information using other applications (apps) compatible with our system.
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
## 2024-01-16 NOTE — ED PROVIDER NOTE - NSDCPRINTRESULTS_ED_ALL_ED
Patient requests all Lab, Cardiology, and Radiology Results on their Discharge Instructions
normal...
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
## 2024-01-19 ENCOUNTER — RESULT CHARGE (OUTPATIENT)
Age: 54
End: 2024-01-19

## 2024-01-19 ENCOUNTER — APPOINTMENT (OUTPATIENT)
Dept: ORTHOPEDIC SURGERY | Facility: CLINIC | Age: 54
End: 2024-01-19
Payer: COMMERCIAL

## 2024-01-19 DIAGNOSIS — M54.50 LOW BACK PAIN, UNSPECIFIED: ICD-10-CM

## 2024-01-19 DIAGNOSIS — S70.12XA CONTUSION OF LEFT HIP, INITIAL ENCOUNTER: ICD-10-CM

## 2024-01-19 DIAGNOSIS — S70.02XA CONTUSION OF LEFT HIP, INITIAL ENCOUNTER: ICD-10-CM

## 2024-01-19 DIAGNOSIS — S89.91XA UNSPECIFIED INJURY OF RIGHT LOWER LEG, INITIAL ENCOUNTER: ICD-10-CM

## 2024-01-19 PROCEDURE — 73502 X-RAY EXAM HIP UNI 2-3 VIEWS: CPT

## 2024-01-19 PROCEDURE — 72100 X-RAY EXAM L-S SPINE 2/3 VWS: CPT

## 2024-01-19 PROCEDURE — 73562 X-RAY EXAM OF KNEE 3: CPT | Mod: RT

## 2024-01-19 PROCEDURE — 99203 OFFICE O/P NEW LOW 30 MIN: CPT

## 2024-01-19 NOTE — IMAGING
[de-identified] : Examination of the right knee, patient has minimal swelling, no ecchymosis, no erythema, significant tenderness over the medial joint. Pain with flexion extension of the knee.  Patient has some neck stiffness to her knee due to swelling. Patient has no signs of instability. Positive Wilfred's. Able to do active straight leg raise. Calf is soft, nontender.  X-ray of the right knee, 3 views were taken, this x-ray was negative for any acute fracture or dislocation, degenerative joint disease noted.  Mild to moderate joint narrowing space over the medial compartment.  Examination of the left hip, patient has a large hematoma over the posterior aspect of the leg.  Patient has good range of motion to her hip. Patient cannot internal and external rotation with no groin pain. Positive straight leg raise. Pain with resisted hip flexion.  Tenderness over the lumbar vertebrate's and over the lumbar paraspinal muscles. Patient has mild weakness to the left lower extremity.   X-ray of the left hip and pelvis was done 2 views were taken, negative for any acute fracture or dislocation, mild degenerative joint disease.  X-ray of the lumbar spine, 2 views were taken, these x-rays shows straightening of the normal lordosis of the cervical spine and disc narrowing space over L4-L5 and L5-S1.
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
## 2024-03-04 ENCOUNTER — APPOINTMENT (OUTPATIENT)
Dept: ORTHOPEDIC SURGERY | Facility: CLINIC | Age: 54
End: 2024-03-04

## 2024-06-19 ENCOUNTER — APPOINTMENT (OUTPATIENT)
Dept: ORTHOPEDIC SURGERY | Facility: CLINIC | Age: 54
End: 2024-06-19
Payer: COMMERCIAL

## 2024-06-19 DIAGNOSIS — M17.11 UNILATERAL PRIMARY OSTEOARTHRITIS, RIGHT KNEE: ICD-10-CM

## 2024-06-19 DIAGNOSIS — M70.62 TROCHANTERIC BURSITIS, LEFT HIP: ICD-10-CM

## 2024-06-19 DIAGNOSIS — S83.231A COMPLEX TEAR OF MEDIAL MENISCUS, CURRENT INJURY, RIGHT KNEE, INITIAL ENCOUNTER: ICD-10-CM

## 2024-06-19 PROCEDURE — 20610 DRAIN/INJ JOINT/BURSA W/O US: CPT | Mod: LT

## 2024-06-19 PROCEDURE — 99203 OFFICE O/P NEW LOW 30 MIN: CPT | Mod: 25

## 2024-06-20 PROBLEM — M70.62 GREATER TROCHANTERIC BURSITIS OF LEFT HIP: Status: ACTIVE | Noted: 2024-06-20

## 2024-06-20 PROBLEM — M17.11 ARTHRITIS OF KNEE, RIGHT: Status: ACTIVE | Noted: 2024-06-20

## 2024-06-21 PROBLEM — S83.231A COMPLEX TEAR OF MEDIAL MENISCUS OF RIGHT KNEE AS CURRENT INJURY, INITIAL ENCOUNTER: Status: ACTIVE | Noted: 2024-06-21

## 2024-06-21 NOTE — REASON FOR VISIT
[FreeTextEntry2] : Patient is here for right knee and left hip pain.Knee feeling better pain management did an injection lost 30 pounds but still having pain in the left hip laterally x-rays do show little calcification around the bursa of the greater trochanter
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
## 2024-06-21 NOTE — ASSESSMENT
[FreeTextEntry1] :  tracked down the MRI of her knee no reason for any intervention right now on the knee we discussed a cortisone injection to the left hip  the option of a  cortisone injection in the lrft hip GTB was discussed with the patient today.  risks and benefits were reviewed and  consent was given.  with sterile technique  through a lateral approach 5 cc dexamethasone (20 mg) and 5 cc 1%  lidocaine was infiltrated with good effect and a  Band-Aid applied ice was  recommended Continue with weight loss I will see her back in a couple months

## 2024-06-21 NOTE — IMAGING
[de-identified] : Examination of the right knee, patient has minimal swelling, no ecchymosis, no erythema, significant tenderness over the medial joint. Pain with flexion extension of the knee.  Patient has some neck stiffness to her knee due to swelling. Patient has no signs of instability. Positive Wilfred's. Able to do active straight leg raise. Calf is soft, nontender.  X-ray of the right knee, 3 views were taken, this x-ray was negative for any acute fracture or dislocation, degenerative joint disease noted.  Mild to moderate joint narrowing space over the medial compartment.  Examination of the left hip, patient has a large hematoma over the posterior aspect of the leg.  Patient has good range of motion to her hip. Patient cannot internal and external rotation with no groin pain. Positive straight leg raise. Pain with resisted hip flexion.  Tenderness over the lumbar vertebrate's and over the lumbar paraspinal muscles. Patient has mild weakness to the left lower extremity.   X-ray of the left hip and pelvis was done 2 views were taken, negative for any acute fracture or dislocation, mild degenerative joint disease.  X-ray of the lumbar spine, 2 views were taken, these x-rays shows straightening of the normal lordosis of the cervical spine and disc narrowing space over L4-L5 and L5-S1. ****MRI right knee 5/31/23: mmT

## 2024-07-10 ENCOUNTER — APPOINTMENT (OUTPATIENT)
Dept: GASTROENTEROLOGY | Facility: CLINIC | Age: 54
End: 2024-07-10

## 2024-08-02 ENCOUNTER — APPOINTMENT (OUTPATIENT)
Dept: NEUROLOGY | Facility: CLINIC | Age: 54
End: 2024-08-02
Payer: COMMERCIAL

## 2024-08-02 VITALS
DIASTOLIC BLOOD PRESSURE: 77 MMHG | SYSTOLIC BLOOD PRESSURE: 108 MMHG | HEART RATE: 76 BPM | BODY MASS INDEX: 36.82 KG/M2 | WEIGHT: 221 LBS | HEIGHT: 65 IN

## 2024-08-02 DIAGNOSIS — M54.16 RADICULOPATHY, LUMBAR REGION: ICD-10-CM

## 2024-08-02 DIAGNOSIS — M54.12 RADICULOPATHY, CERVICAL REGION: ICD-10-CM

## 2024-08-02 PROCEDURE — G2211 COMPLEX E/M VISIT ADD ON: CPT

## 2024-08-02 PROCEDURE — 99214 OFFICE O/P EST MOD 30 MIN: CPT

## 2024-08-02 NOTE — HISTORY OF PRESENT ILLNESS
[FreeTextEntry1] : Since last visit she has been unable to do much PT because her mother passed away.  Her neck pain has significantly worsened as well as her lower back She has done PT for her neck and the lower back     Since last visit she has continued to have cervical spine pain.  She has noticed limited mobility in her neck and has to turn around to talk to people and when driving. Has not noticed much weakness over the last year  From last visit on 9/27/2022: Ms. Denise is a 52yo woman here for headaches and mostly neck pain.  she was seen by me back in 3/10/2022 for similar complaints and had an MRI cervical spine done showing varying degrees of mild, moderate/severe spinal stenosis and radiculopathy.  Since then she has developed more significant neck pain and also headaches frequently.  She has also noticed increased weakness in her left arm and believes it is because she is not exercising.  She had CTSx in left hand a few years ago  Since last visit she had MRI brain (unremarkable and MRI cervical spine (which showed worsening disease with moderate to severe foraminal narrowing).  She has been doing PT with some improvement but still continuous pain in the neck and headaches.  I referred to neurosurgery for evaluation however she did not make an appointment.  She is also having pain in the right hip lower back region

## 2024-08-02 NOTE — DISCUSSION/SUMMARY
[FreeTextEntry1] : Ms. Denise is a 55yo woman with headaches and severe neck pain with weakness in left UE and recent.  She has been getting frequent HA.   1. MRI cervical and lumbar spine w/o LYNDA 2. PT of the cervical and lumbar spine 3. f/u in 6 months

## 2024-10-21 ENCOUNTER — APPOINTMENT (OUTPATIENT)
Dept: ORTHOPEDIC SURGERY | Facility: CLINIC | Age: 54
End: 2024-10-21
Payer: COMMERCIAL

## 2024-10-21 DIAGNOSIS — S83.231A COMPLEX TEAR OF MEDIAL MENISCUS, CURRENT INJURY, RIGHT KNEE, INITIAL ENCOUNTER: ICD-10-CM

## 2024-10-21 DIAGNOSIS — M70.62 TROCHANTERIC BURSITIS, LEFT HIP: ICD-10-CM

## 2024-10-21 DIAGNOSIS — M17.11 UNILATERAL PRIMARY OSTEOARTHRITIS, RIGHT KNEE: ICD-10-CM

## 2024-10-21 PROCEDURE — 99213 OFFICE O/P EST LOW 20 MIN: CPT

## 2025-02-19 ENCOUNTER — APPOINTMENT (OUTPATIENT)
Dept: ORTHOPEDIC SURGERY | Facility: CLINIC | Age: 55
End: 2025-02-19
Payer: COMMERCIAL

## 2025-02-19 DIAGNOSIS — M54.16 RADICULOPATHY, LUMBAR REGION: ICD-10-CM

## 2025-02-19 DIAGNOSIS — S83.231A COMPLEX TEAR OF MEDIAL MENISCUS, CURRENT INJURY, RIGHT KNEE, INITIAL ENCOUNTER: ICD-10-CM

## 2025-02-19 DIAGNOSIS — M17.11 UNILATERAL PRIMARY OSTEOARTHRITIS, RIGHT KNEE: ICD-10-CM

## 2025-02-19 DIAGNOSIS — M70.62 TROCHANTERIC BURSITIS, LEFT HIP: ICD-10-CM

## 2025-02-19 PROCEDURE — 99213 OFFICE O/P EST LOW 20 MIN: CPT

## 2025-02-26 DIAGNOSIS — Z12.11 ENCOUNTER FOR SCREENING FOR MALIGNANT NEOPLASM OF COLON: ICD-10-CM

## 2025-02-26 RX ORDER — SODIUM SULFATE, POTASSIUM SULFATE AND MAGNESIUM SULFATE 1.6; 3.13; 17.5 G/177ML; G/177ML; G/177ML
17.5-3.13-1.6 SOLUTION ORAL
Qty: 1 | Refills: 0 | Status: ACTIVE | COMMUNITY
Start: 2025-02-26 | End: 1900-01-01

## 2025-03-14 ENCOUNTER — OUTPATIENT (OUTPATIENT)
Dept: OUTPATIENT SERVICES | Facility: HOSPITAL | Age: 55
LOS: 1 days | End: 2025-03-14
Payer: COMMERCIAL

## 2025-03-14 DIAGNOSIS — R92.8 OTHER ABNORMAL AND INCONCLUSIVE FINDINGS ON DIAGNOSTIC IMAGING OF BREAST: ICD-10-CM

## 2025-03-14 PROCEDURE — 77062 BREAST TOMOSYNTHESIS BI: CPT | Mod: 26

## 2025-03-14 PROCEDURE — G0279: CPT

## 2025-03-14 PROCEDURE — 77066 DX MAMMO INCL CAD BI: CPT | Mod: 26

## 2025-03-14 PROCEDURE — 76642 ULTRASOUND BREAST LIMITED: CPT | Mod: 50

## 2025-03-14 PROCEDURE — 76642 ULTRASOUND BREAST LIMITED: CPT | Mod: 26,50

## 2025-03-14 PROCEDURE — 77066 DX MAMMO INCL CAD BI: CPT

## 2025-03-15 DIAGNOSIS — R92.8 OTHER ABNORMAL AND INCONCLUSIVE FINDINGS ON DIAGNOSTIC IMAGING OF BREAST: ICD-10-CM

## 2025-04-10 ENCOUNTER — TRANSCRIPTION ENCOUNTER (OUTPATIENT)
Age: 55
End: 2025-04-10

## 2025-04-10 ENCOUNTER — OUTPATIENT (OUTPATIENT)
Dept: OUTPATIENT SERVICES | Facility: HOSPITAL | Age: 55
LOS: 1 days | Discharge: HOME | End: 2025-04-10
Payer: COMMERCIAL

## 2025-04-10 ENCOUNTER — RESULT REVIEW (OUTPATIENT)
Age: 55
End: 2025-04-10

## 2025-04-10 VITALS
HEART RATE: 77 BPM | WEIGHT: 192.9 LBS | SYSTOLIC BLOOD PRESSURE: 101 MMHG | RESPIRATION RATE: 20 BRPM | DIASTOLIC BLOOD PRESSURE: 70 MMHG | TEMPERATURE: 99 F | HEIGHT: 65 IN | OXYGEN SATURATION: 95 %

## 2025-04-10 VITALS
SYSTOLIC BLOOD PRESSURE: 98 MMHG | TEMPERATURE: 98 F | OXYGEN SATURATION: 100 % | HEART RATE: 60 BPM | DIASTOLIC BLOOD PRESSURE: 55 MMHG | RESPIRATION RATE: 22 BRPM

## 2025-04-10 DIAGNOSIS — K31.4 GASTRIC DIVERTICULUM: ICD-10-CM

## 2025-04-10 DIAGNOSIS — Z79.85 LONG-TERM (CURRENT) USE OF INJECTABLE NON-INSULIN ANTIDIABETIC DRUGS: ICD-10-CM

## 2025-04-10 DIAGNOSIS — R10.11 RIGHT UPPER QUADRANT PAIN: ICD-10-CM

## 2025-04-10 DIAGNOSIS — K57.30 DIVERTICULOSIS OF LARGE INTESTINE WITHOUT PERFORATION OR ABSCESS WITHOUT BLEEDING: ICD-10-CM

## 2025-04-10 DIAGNOSIS — K29.50 UNSPECIFIED CHRONIC GASTRITIS WITHOUT BLEEDING: ICD-10-CM

## 2025-04-10 DIAGNOSIS — Z98.890 OTHER SPECIFIED POSTPROCEDURAL STATES: Chronic | ICD-10-CM

## 2025-04-10 DIAGNOSIS — E03.9 HYPOTHYROIDISM, UNSPECIFIED: ICD-10-CM

## 2025-04-10 DIAGNOSIS — K64.4 RESIDUAL HEMORRHOIDAL SKIN TAGS: ICD-10-CM

## 2025-04-10 DIAGNOSIS — K80.20 CALCULUS OF GALLBLADDER WITHOUT CHOLECYSTITIS WITHOUT OBSTRUCTION: ICD-10-CM

## 2025-04-10 PROCEDURE — 88312 SPECIAL STAINS GROUP 1: CPT | Mod: 26

## 2025-04-10 PROCEDURE — 88312 SPECIAL STAINS GROUP 1: CPT

## 2025-04-10 PROCEDURE — 88305 TISSUE EXAM BY PATHOLOGIST: CPT | Mod: 26

## 2025-04-10 PROCEDURE — 88305 TISSUE EXAM BY PATHOLOGIST: CPT

## 2025-04-10 PROCEDURE — 81025 URINE PREGNANCY TEST: CPT

## 2025-04-10 PROCEDURE — 45380 COLONOSCOPY AND BIOPSY: CPT

## 2025-04-10 PROCEDURE — 43239 EGD BIOPSY SINGLE/MULTIPLE: CPT | Mod: XS

## 2025-04-10 NOTE — ASU PATIENT PROFILE, ADULT - INTERNATIONAL TRAVEL
Detail Level: Zone
Recommendations (Free Text): Patient states psoriasis cleared up when pregnant, but has now returned\\nPatient is breastfeeding \\nCloderm apply to affected areas on face twice daily for two weeks then as needed for flares \\nClobetasol ointment apply to affected areas on arms and legs twice daily for two weeks \\nTold 15-20 min of sun exposure may help, wear sunscreen \\nCerave cream apply twice daily as needed
No

## 2025-04-10 NOTE — ASU DISCHARGE PLAN (ADULT/PEDIATRIC) - FOR NEXT 24 HOURS DO NOT:
Chest x-ray today   Continue arm restrictions until 12/4/21  You may shower starting today    Call if any signs or symptoms of infection [phone] ext: [phone]  Fevers, chills, redness, swelling or drainage.        Hook up home  [address], [address] support (home monitoring) [phone]
Statement Selected

## 2025-04-10 NOTE — ASU DISCHARGE PLAN (ADULT/PEDIATRIC) - PROCEDURE
Recommend going straight to the emergency room CT of the head if the headache worsens along with worsening neck pain, dizziness, lightheadedness, nausea, vomiting.   EGD & Colonoscopy

## 2025-04-10 NOTE — ASU DISCHARGE PLAN (ADULT/PEDIATRIC) - FINANCIAL ASSISTANCE
[facility] provides services at a reduced cost to those who are determined to be eligible through [facility]’s financial assistance program. Information regarding [facility]’s financial assistance program can be found by going to [email or web address] or by calling [phone].

## 2025-04-11 LAB
SURGICAL PATHOLOGY STUDY: SIGNIFICANT CHANGE UP
SURGICAL PATHOLOGY STUDY: SIGNIFICANT CHANGE UP

## 2025-05-01 NOTE — ED ADULT TRIAGE NOTE - PATIENT ON (OXYGEN DELIVERY METHOD)
Infectious Diseases Inpatient Progress Note          HISTORY OF PRESENT ILLNESS:  Follow up sepsis without definite etiology in a patient with acute encephalopathy with underlying cognitive impairment on IV Rocephin, well tolerated.  Currently on modified diet for moderate dysphagia per speech therapist.  Remains to be very lethargic and tachycardic with persistent leukocytosis.  Has poor appetite.   Blood cultures remain negative.   Right second toe wound.   Current Medications:     sodium chloride flush  5-40 mL IntraVENous 2 times per day    [Held by provider] enoxaparin  40 mg SubCUTAneous Daily    Vitamin D  2,000 Units Oral Daily    escitalopram  5 mg Oral Daily    finasteride  5 mg Oral Daily    [Held by provider] hydroCHLOROthiazide  25 mg Oral Daily    losartan  50 mg Oral Daily    tamsulosin  0.4 mg Oral BID RT    cefTRIAXone (ROCEPHIN) IV  2,000 mg IntraVENous Q24H    memantine  5 mg Oral BID       Allergies:  Sulfa antibiotics      Review of Systems  unable to provide ROS because of altered mentation      Physical Exam  Vitals:    05/01/25 0145 05/01/25 0523 05/01/25 0600 05/01/25 0729   BP: (!) 114/56 123/63  (!) 120/54   Pulse: (!) 111 (!) 108  (!) 102   Resp: 18   18   Temp: 97.8 °F (36.6 °C) 97.9 °F (36.6 °C)  98.2 °F (36.8 °C)   TempSrc: Axillary Axillary  Axillary   SpO2:  92%  94%   Weight:   80.2 kg (176 lb 12.8 oz)    Height:         General Appearance: Lethargic and non arousable, in no acute distress  Skin: warm and dry, no rash.   Head: normocephalic and atraumatic  Eyes: anicteric sclerae  Lungs: normal respiratory effort, clear lungs  Heart normal S1-S2, tachycardic no murmur  Abdomen: soft, no tenderness, no distention or rigidity  +2 B leg edema  No erythema, no tenderness  Slightly bloody urine in pure wick    DATA:    Lab Results   Component Value Date    WBC 12.1 (H) 05/01/2025    HGB 10.0 (L) 05/01/2025    HCT 28.6 (L) 05/01/2025    MCV 97.9 (H) 05/01/2025     05/01/2025  room air

## 2025-05-14 ENCOUNTER — APPOINTMENT (OUTPATIENT)
Dept: GASTROENTEROLOGY | Facility: CLINIC | Age: 55
End: 2025-05-14
Payer: COMMERCIAL

## 2025-05-14 DIAGNOSIS — Z78.9 OTHER SPECIFIED HEALTH STATUS: ICD-10-CM

## 2025-05-14 DIAGNOSIS — K63.5 POLYP OF COLON: ICD-10-CM

## 2025-05-14 DIAGNOSIS — Z87.09 PERSONAL HISTORY OF OTHER DISEASES OF THE RESPIRATORY SYSTEM: ICD-10-CM

## 2025-05-14 DIAGNOSIS — Z87.898 PERSONAL HISTORY OF OTHER SPECIFIED CONDITIONS: ICD-10-CM

## 2025-05-14 DIAGNOSIS — Z86.69 PERSONAL HISTORY OF OTHER DISEASES OF THE NERVOUS SYSTEM AND SENSE ORGANS: ICD-10-CM

## 2025-05-14 DIAGNOSIS — K80.20 CALCULUS OF GALLBLADDER W/OUT CHOLECYSTITIS W/OUT OBSTRUCTION: ICD-10-CM

## 2025-05-14 DIAGNOSIS — K60.30 ANAL FISTULA, UNSPECIFIED: ICD-10-CM

## 2025-05-14 DIAGNOSIS — Z86.39 PERSONAL HISTORY OF OTHER ENDOCRINE, NUTRITIONAL AND METABOLIC DISEASE: ICD-10-CM

## 2025-05-14 DIAGNOSIS — Z87.891 PERSONAL HISTORY OF NICOTINE DEPENDENCE: ICD-10-CM

## 2025-05-14 DIAGNOSIS — Z87.42 PERSONAL HISTORY OF OTHER DISEASES OF THE FEMALE GENITAL TRACT: ICD-10-CM

## 2025-05-14 DIAGNOSIS — Z87.19 PERSONAL HISTORY OF OTHER DISEASES OF THE DIGESTIVE SYSTEM: ICD-10-CM

## 2025-05-14 PROCEDURE — 99213 OFFICE O/P EST LOW 20 MIN: CPT | Mod: 95

## 2025-06-11 ENCOUNTER — APPOINTMENT (OUTPATIENT)
Dept: ORTHOPEDIC SURGERY | Facility: CLINIC | Age: 55
End: 2025-06-11

## 2025-07-08 ENCOUNTER — OUTPATIENT (OUTPATIENT)
Dept: OUTPATIENT SERVICES | Facility: HOSPITAL | Age: 55
LOS: 1 days | End: 2025-07-08
Payer: COMMERCIAL

## 2025-07-08 ENCOUNTER — RESULT REVIEW (OUTPATIENT)
Age: 55
End: 2025-07-08

## 2025-07-08 DIAGNOSIS — Z98.890 OTHER SPECIFIED POSTPROCEDURAL STATES: Chronic | ICD-10-CM

## 2025-07-08 DIAGNOSIS — Z00.8 ENCOUNTER FOR OTHER GENERAL EXAMINATION: ICD-10-CM

## 2025-07-08 DIAGNOSIS — R91.1 SOLITARY PULMONARY NODULE: ICD-10-CM

## 2025-07-08 PROCEDURE — 71250 CT THORAX DX C-: CPT

## 2025-07-08 PROCEDURE — 71250 CT THORAX DX C-: CPT | Mod: 26

## 2025-07-09 DIAGNOSIS — R91.1 SOLITARY PULMONARY NODULE: ICD-10-CM

## 2025-08-20 DIAGNOSIS — M54.12 RADICULOPATHY, CERVICAL REGION: ICD-10-CM
